# Patient Record
Sex: FEMALE | Race: WHITE | Employment: FULL TIME | URBAN - METROPOLITAN AREA
[De-identification: names, ages, dates, MRNs, and addresses within clinical notes are randomized per-mention and may not be internally consistent; named-entity substitution may affect disease eponyms.]

---

## 2018-01-30 ENCOUNTER — TRANSFERRED RECORDS (OUTPATIENT)
Dept: HEALTH INFORMATION MANAGEMENT | Facility: CLINIC | Age: 68
End: 2018-01-30

## 2018-02-07 ENCOUNTER — HOSPITAL ENCOUNTER (OUTPATIENT)
Dept: BEHAVIORAL HEALTH | Facility: CLINIC | Age: 68
End: 2018-02-07
Attending: PSYCHIATRY & NEUROLOGY
Payer: COMMERCIAL

## 2018-02-07 ASSESSMENT — PAIN SCALES - GENERAL: PAINLEVEL: NO PAIN (0)

## 2018-02-07 NOTE — PROGRESS NOTES
"Standard Diagnostic Assessment     CLIENT'S NAME: Michelle Michael  MRN:   [id number]  :   1950 AGE:67 year old SEX: female  ACCT. NUMBER: [id number]  DATE OF SERVICE: 18 Start Time:  1315 End Time:  1500      Home Phone [phone]   Work Phone Not on file.   Mobile [phone]     Preferred Phone:   May we leave a program related message? yes    Yes, the patient has been informed that any other mental health professional providing mental health services to me will need access to this Diagnostic Assessment in order to develop a treatment plan and receive payment.     Identifying Information:  Michelle Michael is a 67 year old, Choose not to answer,  female. Michelle attended the   alone.     Reason for Referral: Michelle was referred to [facility] by therapist. Michelle reports the reason for referral at this time is depression, anxiety, stress at work.    Michelle verbalizes the following treatment/discharge goals: Develop and apply coping skills to deal with the stress and anxiety at work, as well as home.     Current Stressors/Losses/Disappointments: problems at work, financial issues around gambling addiction    Per Client, Review of Symptoms:  Mood (Depression/Anxiety/Joi/Anger): depressed, anxious   Thoughts: \"scattered, my mind is cluttered by fear and anxiety\"   Concentration/Memory: poor   Appetite/Weight: (see also, Physical Health Screening below)  poor  Sleep: very poor, ruminating about work, having nightmares  Motivation/Energy: low  Behavior:  Withdrawn and isolative, angry outbursts    Psychosis:  none    Trauma:  Work issues  Other: recent move     Mental Health History:  Michelle reports first onset of mental health symptoms at age 33, depression.  Michelle was first diagnosed depression.   Michelle received the following mental health services in the past: counseling, physician / PCP, medication(s) from physician / PCP and primary care behavioral health provider.   Psychiatric " "Hospitalizations: None.   Michelle denies a history of civil commitment.      Onset/Duration/Pattern of Symptoms noted above:   Anxiety periodic throughout the day, sometimes more anxious at times that last about an hour.    Michelle reports the following understanding of her diagnosis: \"I don't need any more treatment for gambling.      Personal Safety:    Are you depressed or being treated for depression? yes   Have you ever thought about hurting yourself (SIB) now or in the past? yes     Have you ever thought about suicide now or in the past? I don't think I'd do it     Do you have a gun, weapons or other means (including medications) to harm yourself available to you? No   Have any of your family members or friends attempted or completed suicide? (If yes, Who, When, How) no     Do you take chances with your safety?   no   Have you currently or in the past had trouble with physical aggression (If yes, describe)? no     Have you ever thought about killing someone else? No   Have you ever heard voices? No       Supports:   From whom do you receive support? (family/friends/agency) Therapist, a couple good friends, 2 sisters     How often do you have contact with them? Twice a week     Do your support people want/need education/resources? no        Is there anything in your life (current or history) that is satisfying to you (include leisure interests/hobbies)?   yes , grandchildren, walking outdoors      Hope/Belief System:  Do you think things can get better? yes     Rate how strongly you believe things can get better:   (Scale 1-5; 1=no belief; 5=Very Strong Belief)    4    What would make it better?  New work environment    What gives you hope?    My belief in God, family       Personal Safety Summary:  After gathering the above information, Michelle  presents the following high risk factors for suicide: Poor sleep Panic,extreme anxiety Extreme psychic pain Hopelessness, Worthlessness.  Michelle denies current fears or " concerns for personal safety.    Michelle has the following Protective Factors: Sense of responsibility to family, Religiosity, Positive social support and Positive therapeutic releationships      Upon review of the patient interview and identification of high risk factors determine individualized safety strategies alternatives and treatment plan interventions. Client consented to co-developed safety plan, which includes resources.     Substance Use History:       Substance: Hx of Use/Abuse: Last Use: Pattern of Use:   Alcohol      Cannabis      Street Drugs      Prescription Drugs      Other        Substance Use Disorder Treatment: Michelle is currently receiving the following services: No indications of CD issues.         Michelle has a negative Cage-Aid score.     Legal History:    Michelle reports that she has not been involved with the legal system.   ________________________________________________________________________    Life Situation (Employment/School/Finances/Basic Needs):  Michelle  is currently living with  in an apartment.   The safety/stability of this environment is described as: safe, stable    Michelle is currently employed full time:   Michelle describes a work Hx of    Michelle reports finances are obtained through Employment  Michelle does identify her finances as a current stressor.  Michelle reports a history of gambling and reports a history of gambling treatment.     Michelle reports her highest level of education is college graduate  Michelle did not identify any learning problems   Michelle describes academic performance as: A's & B's   Michelle describes school social experience as: ok, lot os freinds     Michelle denies concerns regarding her current ability to meet basic needs.     Social/Family History:  Michelle  reports she grew up in [place], [place]. Some moving when father was in the [employer]  Michelle was the second born of 5 children. All girls.  Michelle reports her biological parents  are     Michelle describes her childhood as kind of stressed. Father alcoholic, moved a lot  Michelle describes her current relationships with her family of origin as getting along pretty well     Michelle identifies her relationship status as: .    Michelle identifies her sexual orientation as: opposite sex   Michelle denies sexual health concerns.     Michelle reports having 3 children. 2 boys, 1 girl    Michelle describes the quantity/quality of her social relationships as good, I have a good support system        Significant Losses / Trauma / Abuse / Neglect Issues / Developmental Incidents:  Michelle reports significant loss/trauma/abuse/neglect issues/developmental incidents   Michelle reports trauma around parents alcoholism  Michelle has addressed the above concerns in previous therapy/treatment     Michelle denies personal  experience.     Yarsanism Preference/Spiritual Beliefs/Cultural Considerations:    A. Ethnic Self-Identification:  Michelle self-identifies her race/ethnicities as:  and her preferred language to be English.   Michelle reports she does not need the assistance of an . Michelle  reports she does not need other support or modifications involved in therapy.      B. Do you experience cultural bias (the practice of interpreting judging behavior by standards inherent to one's own culture) by other people as a stressor? If yes, describe how this relates to overall mental health symptoms.  No    C. Are there any cultural influences that may need to be considered for your treatment?  (This includes historical, geographical and familial factors that affect assessment and intervention processes). No, Denies any cultural influences or concerns that need to be considered for treatment    Strengths/Vulnerabilities:   Michelle identifies her personal strengths as: caring, committed to sobriety, creative, educated, empathetic, employed, goal-focused, good listener, has a previous history of  therapy, insightful, intelligent, motivated, open to learning, open to suggestions / feedback, responsible parent, support of family, friends and providers, supportive, wants to learn, willing to ask questions, willing to relate to others and work history .   Things that may interfere with the clients success in treatment include: continued undue pressure at work.   Other identified areas of vulnerability include: Poor impulse control  Anxiety with/without panic attacks  Active/history of addiction/substance abuse  Depressive symptoms.     Medical History / Physical Health Screen:     Primary Care Physician: Michelle has a non-[facility] Primary Care Provider. Their PCP is Teresa Cristina..   Last Physical Exam: within the past year. Client was encouraged to follow up with PCP if symptoms were to develop.    Mental Health Medication Management Provider / Psychiatrist: Michelle has a psychiatrist whose name and location are: [facility].  [facility]   Last visit: 1-18        Next visit: Appt not made will see in March    Current medications including prescription, non-prescription, herbals, dietary aids and vitamins:  Per client report:   No outpatient prescriptions have been marked as taking for the 2/7/18 encounter (Hospital Encounter) with Dave Hendrickson LICSW.       Michelle reports current medications are: Effective.   Michelle describes taking her medications as: Independent.  Michelle reports taking prescribed medications as prescribed.     Michelle provides the following current assessment of pain:  ; Pain Score: No Pain (0);  .     Michelle provides the following information regarding past significant medical conditions/diagnoses:      Medical:  Past Medical History:   Diagnosis Date     Arthritis      Depressive disorder      GERD (gastroesophageal reflux disease)      Hypertension        Surgical:  Past Surgical History:   Procedure Laterality Date     APPENDECTOMY       GYN SURGERY  1987    hysterectomy     Allergy:   Michelle  reports   Allergies   Allergen Reactions     Hmg-Coa-R Inhibitors Muscle Pain (Myalgia)        Family History of Medical, Mental Health and/or Substance Use problems:  Per client report:   Family History   Problem Relation Age of Onset     Hypertension Mother      Anxiety Disorder Father      Depression Father      Substance Abuse Father      Hypertension Father      Substance Abuse Sister      Substance Abuse Sister      Breast Cancer Sister        Michelle reports no current medical concerns.      General Health:   Have you had any exposure to any communicable disease in the past 2-3 weeks? no     Are you aware of safe sex practices? yes     Is there a possibility of pregnancy?  no       Nutrition:    Are you on a special diet? If yes, please explain:  no   Do you have any concerns regarding your nutritional status? If yes, please explain:  no   Have you had any appetite changes in the last 3 months?  No     Have you had any weight loss or weight gain in the last 3 months?  No     Do you have a history of an eating disorder? no   Do you have a history of being in an eating disorder program? no   NOTE: BMI to be calculated following program admission.    Fall Risk:   Have you had any falls in the past 3 months? no     Do you currently useany assistive devices for mobility?   no     NOTE: If client reports 3 or more falls in the past 3 months, the client will not be accepted into the program until further assessment is completed by the program nurse. Check if a nurse is available to assess at time of DA.    NOTE: If client reports 2 falls in the past 3 months and/or the client currently uses assistive devices for mobility, the  will send an in-basket to the program nurse to meet with the client within the first week of programming.    Head Injury/Trauma:   Do you have a history of head injury / trauma? no     Do you have any cognitive impairment? no       Per completion of the Medical History / Physical Health  Screen, is there a recommendation to see / follow up with a primary care physician/clinic?    No.      Clinical Findings     Mental Status Assessment/Clinical Observation:  Appearance:   casually dressed and slightly unkempt  Eye Contact:   good  Psychomotor Behavior: Normal  no evidence of tardive dyskinesia, dystonia, or tics  Attitude:   Cooperative    Oriented to:   All    Speech   Rate / Production: Pressured    Volume:  Normal   Mood:    Anxious  Depressed  Sad     Affect:    Subdued  Worrisome      Thought Content:  Clear  no evidence of suicidal ideation or homicidal ideation  Thought Form:  logical, linear and goal oriented no loose associations  Insight:    good    Judgment:     intact  Attention Span/Concentration: intact  Recent and Remote Memory:  intact      Psychiatric Diagnosis:    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    Provisional Diagnostic Hypothesis (Explain R/O, other Provisional Diagnosis, and why alternative Diagnosis that were considered were ruled out):   N/A    Medical Concerns that may Impact Treatment:   None    Psychosocial and Contextual Factors (V-Codes):  V62.29 Other problem related to employment conflict with new manager    WHODAS 2.0 SCORE: 25/93.8 %      Client and family participation in assessment:   Michelle was alone during this assessment.   This assessment does include collateral information.      Summary & Recommendations  Provide a brief summary of how diagnostic criteria is met (symptoms, duration & functional impairment), cause, prognosis, and likely consequences of symptoms. Include overview of pertinent client strengths, cultural influences, life situations, relationships, health concerns and how diagnosis interacts/impacts with client's life. Recommendations include: client preferences, prioritization of needed mental health, ancillary or other services and any referrals to services  "required by statute or rule.     The client presents with symptoms of high anxiety and depression. We had attempted to complete the DA on 2/7, but client was not sure if any programs would be helpful, so did not proceed. She now requests the Partial Hospital Program, as the 55+ does not feel like enough support for what she needs right now. She has been having ongoing problems at work since she got a new manager. Her performance review was good regarding her work, but she was criticized for what she deems as \"character flaws\". She is extremely frightened of losing her job. Her  is retired. She has been through gambling treatment and has abstained for four months. She reports that she attends GA regularly, has a sponsor and some good friends in that program. However, she has also incurred a significant amount of debt, due to her gambling and is trying to pay this back. The client has an individual therapist and a CNP who prescribes medication. Her anxiety has been increasing and at times feels on the verge of a panic attack at work. She maintains that she still is able to complete her work, but does not know how much longer she can deal with this. She feels that her work situation has become almost adversarial and she has retained an  because of this. She feels if she can learn some effective coping technics and how to manage her anxiety she will be able to return to work. Currently, she has begun to sleep less and when she does sleep she has nightmares. She has very low energy and her relationship with her  is impacted by her situation. She apparently owes him money so he \"is not supportive, he just wants me to go work and pay him back\".   Prognosis is Fair. Without the recommended intervention, the client is likely to experience the following consequences of their symptoms: Increased anxiety and depression, likely return to gambling, loss of her job, potential hospitalization..    Referrals to " services required by statute or rule:   Report to child/adult protection services was NA.   Was/were discussed and client will pursue.    Program Recommendation: Partial Hospital Program.      Assessment Completed by: CATRACHITO ART RNC, Dave Hendrickson LICSW

## 2018-02-07 NOTE — PROGRESS NOTES
After meeting with client and explaining the various outpatient programs, the client felt she was uncertain if it would be able to work for her. She was hoping that the Dual Diagnosis program might be helpful, but after discussing the issue with the lead therapist, it was decided that, given the absence of a substance abuse problem, she would not meet criteria for that program. She was not interested in attending the gambling Treatment program, as she has been in that previously. The client also felt that the [facility] Program was not long enough and the 55+ program may not be appropriate for her right now.    The client decided to not proceed any further with the intake process and would discuss her situation further with her individual therapist, as well as her manager at work. She indicated that, perhaps in the future, if she felt it appropriate, she would us and set up another appointment for a diagnostic assessment.

## 2018-02-19 ENCOUNTER — HOSPITAL ENCOUNTER (OUTPATIENT)
Dept: BEHAVIORAL HEALTH | Facility: CLINIC | Age: 68
Discharge: HOME | End: 2018-02-19
Attending: PSYCHIATRY & NEUROLOGY | Admitting: PSYCHIATRY & NEUROLOGY
Payer: COMMERCIAL

## 2018-02-19 PROCEDURE — 90791 PSYCH DIAGNOSTIC EVALUATION: CPT

## 2018-02-23 ENCOUNTER — TELEPHONE (OUTPATIENT)
Dept: BEHAVIORAL HEALTH | Facility: CLINIC | Age: 68
End: 2018-02-23

## 2018-02-23 NOTE — TELEPHONE ENCOUNTER
D: Received call from pt this AM. She inquired about being able to start PHP on Monday. She met with her psychologist yesterday. They requested PHP help pt complete STD paperwork. Both support a plan for pt to return to work following completion of PHP. Pt also informed writer that she has to take her 89 yo mother for some all day dental work on Tuesday so she would not be able to attend programming.    I: Coordinated start date.     A: Pt is eager to start PHP.    P: Rescheduled start in PHP for 1/26/18. Informed PHP therapist of the above.    Jo Montez, TRE, [facility]  2/23/2018

## 2018-02-26 ENCOUNTER — HOSPITAL ENCOUNTER (OUTPATIENT)
Dept: BEHAVIORAL HEALTH | Facility: CLINIC | Age: 68
End: 2018-02-26
Attending: PSYCHIATRY & NEUROLOGY
Payer: COMMERCIAL

## 2018-02-26 VITALS — BODY MASS INDEX: 41.83 KG/M2 | HEIGHT: 64 IN | WEIGHT: 245 LBS

## 2018-02-26 PROBLEM — F63.0 PATHOLOGICAL GAMBLING: Status: ACTIVE | Noted: 2018-02-26

## 2018-02-26 PROCEDURE — H0035 MH PARTIAL HOSP TX UNDER 24H: HCPCS

## 2018-02-26 ASSESSMENT — ANXIETY QUESTIONNAIRES
6. BECOMING EASILY ANNOYED OR IRRITABLE: MORE THAN HALF THE DAYS
1. FEELING NERVOUS, ANXIOUS, OR ON EDGE: NEARLY EVERY DAY
2. NOT BEING ABLE TO STOP OR CONTROL WORRYING: NEARLY EVERY DAY
3. WORRYING TOO MUCH ABOUT DIFFERENT THINGS: MORE THAN HALF THE DAYS
GAD7 TOTAL SCORE: 14
IF YOU CHECKED OFF ANY PROBLEMS ON THIS QUESTIONNAIRE, HOW DIFFICULT HAVE THESE PROBLEMS MADE IT FOR YOU TO DO YOUR WORK, TAKE CARE OF THINGS AT HOME, OR GET ALONG WITH OTHER PEOPLE: EXTREMELY DIFFICULT
7. FEELING AFRAID AS IF SOMETHING AWFUL MIGHT HAPPEN: MORE THAN HALF THE DAYS
5. BEING SO RESTLESS THAT IT IS HARD TO SIT STILL: SEVERAL DAYS

## 2018-02-26 ASSESSMENT — PATIENT HEALTH QUESTIONNAIRE - PHQ9: 5. POOR APPETITE OR OVEREATING: SEVERAL DAYS

## 2018-02-26 NOTE — PROGRESS NOTES
Adult Mental Health   Mental Health Program   Progress Note   Group Time: 11:00-11:50    Client Initial Individualized Goals for Treatment:  Develop and apply coping skills to deal with the stress and anxiety at work, as well as home.       See Initial Treatment suggestions for the client during the time between Diagnostic Assessment and completion of the Master Individualized Treatment Plan.    Treatment Goals:     Follow Safety Plan   Ask for more information, support and/or assistance as needed.  Follow up with providers/community supports as needed:   Report increases or changes in symptoms to staff.  Report any personal safety concerns to staff.   Take medications as prescribed.  Report medication changes and/or side effects to staff.  Attend and participate in groups as scheduled or notify staff if unable to do so.  Report any use of substances to staff as this may impact your symptoms and/or                      personal safety.  Notify staff if you have any other issues that need to be addressed. This may include              any current abuse / neglect / exploitation or other vulnerability.  Follow recommendations of your treatment team and discuss concerns if not in            agreement       Area of Treatment Focus:  Symptom Management, Develop / Improve Independent Living Skills, Develop Socialization / Interpersonal Relationship Skills and Physical Health     Therapeutic Interventions/Treatment Strategies:  Support, Redirection, Feedback, Structured Activity, Problem Solving, Education and Motivational Enhancement Therapy    Response to Treatment Strategies:  Accepted Feedback, Gave Feedback, Listened, Attentive, Accepted Support and Alert    Name of Group:  Excercise    Description and Outcome:  Group discussed the importance of Exercise. The 3 different types of exercise were discussed, cardio-vascular, strength and flexibility.Group talked about the benefits to exercise and discussed was to  incorporate exercise into their weekly routines. Group discussed mental barriers to exercise, safety tips while exercising, motivation techniques to exercise and group worked through case study to problem solve ways to incorporate exercise into daily life. Mindfulness chair yoga was practiced for 15 minutes.   Assessment  Appearance/ Mood: Calm behavior, range in affect, stable mood throughout group. Affect was consistent with mood.  Appropriate dress, well-groomed and was cooperative within group.   Thought Process: Linear and logical, productive and goal directed. Contributed to group conversation.   Behavior: Cooperative, interacted within the group, listened and was respectful to others    Understanding of the lesson: Patient was able to explain benefits to exercise.       Is this a Weekly Review of the Progress on the Treatment Plan?  No

## 2018-02-26 NOTE — PROGRESS NOTES
Group Therapy Progress Notes     Client Initial Individualized Goals for Treatment:  Develop and apply coping skills to deal with the stress and anxiety at work, as well as home.         See Initial Treatment suggestions for the client during the time between Diagnostic Assessment and completion of the Master Individualized Treatment Plan.     Treatment Goals:      Follow Safety Plan   Ask for more information, support and/or assistance as needed.  Follow up with providers/community supports as needed:   Report increases or changes in symptoms to staff.  Report any personal safety concerns to staff.   Take medications as prescribed.  Report medication changes and/or side effects to staff.  Attend and participate in groups as scheduled or notify staff if unable to do so.  Report any use of substances to staff as this may impact your symptoms and/or   personal safety.  Notify staff if you have any other issues that need to be addressed. This may include  any current abuse / neglect / exploitation or other vulnerability.  Follow recommendations of your treatment team and discuss concerns if not in  agreement    Area of Treatment Focus:  Symptom Management    Therapeutic Interventions/Treatment Strategies:  Support, Safety Assessments, Structured Activity and Education    Response to Treatment Strategies:  Accepted Feedback, Listened, Focused on Goals, Attentive and Accepted Support    Name of Group:  Self Talk    Progress Note  Michelle was engaged in self talk and participated in mapping the impact of a change physiologically, emotionally, cognitively, behaviorally and spiritually.  We discussed how changes in lifestyle and thought patterns impact the sympathetic nervous system.  She found her first day of the partial program helpful and was planning on returning tomorrow. She was going to relax and rest due to feeling tired.           Is this a Weekly Review of the Progress on the Treatment Plan?  No

## 2018-02-26 NOTE — PROGRESS NOTES
RN Review of Medical History / Physical Health Screen  Outpatient Behavioral Programs      CLIENT'S NAME: Michelle Michael  MRN:   [id number]  :   1950 AGE:67 year old SEX: female    DATE OF DIAGNOSTIC ASSESSMENT: 18  DATE OF ADMISSION: 18   PROGRAM: [facility] ()      Following admission, the RN reviewed the following:    - Medical History / Physical Health Screen completed during the DA noted above.  - Immediate Health Concerns as noted on the Initial Individual Treatment Plan.    Client height and weight recorded by RN in epic: yes    BMI Review:  Was the patient informed of BMI? yes      Findings Above,  General nutrition education       RN Recommendations include: Continue to educate on nutrition and exercise. Educate on the importance to monitor regularly and if increase continues follow up with primary provider.        Brodie Casey  2018

## 2018-02-27 ENCOUNTER — HOSPITAL ENCOUNTER (OUTPATIENT)
Dept: BEHAVIORAL HEALTH | Facility: CLINIC | Age: 68
End: 2018-02-27
Attending: PSYCHIATRY & NEUROLOGY
Payer: COMMERCIAL

## 2018-02-27 PROCEDURE — H0035 MH PARTIAL HOSP TX UNDER 24H: HCPCS

## 2018-02-27 ASSESSMENT — PATIENT HEALTH QUESTIONNAIRE - PHQ9: SUM OF ALL RESPONSES TO PHQ QUESTIONS 1-9: 12

## 2018-02-27 ASSESSMENT — ANXIETY QUESTIONNAIRES: GAD7 TOTAL SCORE: 14

## 2018-02-27 NOTE — PROGRESS NOTES
Group Therapy Progress Notes     Client Initial Individualized Goals for Treatment:  Develop and apply coping skills to deal with the stress and anxiety at work, as well as home.           See Initial Treatment suggestions for the client during the time between Diagnostic Assessment and completion of the Master Individualized Treatment Plan.      Treatment Goals:       Follow Safety Plan   Ask for more information, support and/or assistance as needed.  Follow up with providers/community supports as needed:   Report increases or changes in symptoms to staff.  Report any personal safety concerns to staff.   Take medications as prescribed.  Report medication changes and/or side effects to staff.  Attend and participate in groups as scheduled or notify staff if unable to do so.  Report any use of substances to staff as this may impact your symptoms and/or   personal safety.  Notify staff if you have any other issues that need to be addressed. This may include  any current abuse / neglect / exploitation or other vulnerability.  Follow recommendations of your treatment team and discuss concerns if not     Area of Treatment Focus:  Symptom Management, Personal Safety, Community Resources/Discharge Planning, Abstinence/Relapse Prevention, Develop / Improve Independent Living Skills and Develop Socialization / Interpersonal Relationship Skills    Therapeutic Interventions/Treatment Strategies:  Support, Safety Assessments, Structured Activity and Education    Response to Treatment Strategies:  Accepted Feedback, Listened, Focused on Goals, Attentive and Accepted Support    Name of Group: Psychotherapy Group and Aftercare Planning    Progress Note  Michelle reports being exhausted today. After the partial program yesterday, her  wanted to bring her to motor vehicle to change her address on her license. They had to wait an hour. Then she went to the Pharmacy to get a new medication filled which had a 45 minute wait. She  reports her  is a bit controlling. He had untreated Bipolar Disorder. Michelle reports waking up at 4: 30 am with a panic attack and feelings of guilt. She states her gambling behaviors and treatment often prevented her from being a mom that she wanted to be with her son who age is 31. She called him and wanted to talk with him. He was at work and resides in [place]. He was going to call her back today. She will not be in the treatment program tomorrow due to having to take her elderly mom to the dentist to get her wisdom teeth out. We discussed the importance of self care and lifestyle balance. In Aftercare Planning, Michelle began naming her recovery needs on her coping cards which is a relapse prevention tool. She has been free from gambling behaviors for five months. She attends GA meeting and has a sponsor. Michelle will resume the partial program on Thursday, March 1.          Is this a Weekly Review of the Progress on the Treatment Plan?  No

## 2018-02-27 NOTE — PROGRESS NOTES
Adult Mental Health Outpatient Group Therapy Progress Note     Client Initial Individualized Goals for Treatment: Develop and utilize coping strategies to deal with anxiety and depression    See Initial Treatment suggestions for the client during the time between Diagnostic Assessment and completion of the Master Individualized Treatment Plan.    Treatment Goals:         Area of Treatment Focus:  Symptom Management, Personal Safety, Community Resources/Discharge Planning, Develop / Improve Independent Living Skills and Develop Socialization / Interpersonal Relationship Skills    Therapeutic Interventions/Treatment Strategies:  Support, Feedback, Education and Cognitive Behavioral Therapy    Response to Treatment Strategies:  Accepted Feedback, Gave Feedback, Listened and Focused on Goals    Name of Group:  Grief and Loss, Mindfulness    Description and Outcome:    Michelle participated in third and last groups of the day. Discussed the range of experiences of loss as including both small daily losses all the way up to the loss of close relationships due to death. Considered model of grief as one which promotes change and growth of the person as they accept feelings and experiences around the loss. Identified central role of allowing the grief experience to proceed in its own particular way, without trying to constantly control the experience.     Discussed impact of mindfulness on depression and anxiety and relationship to non-judgmental awareness of emotions. Practiced mindfulness, discussed importance of regular daily practice. Client demonstrated understanding of session by identifying examples of how content applied to self, asking clarifying questions, providing feedback to other group members who were addressing concepts.    Is this a Weekly Review of the Progress on the Treatment Plan?  No

## 2018-02-27 NOTE — PROGRESS NOTES
Adult Mental Health Partial Hospitalization Group Therapy Progress Note     Date: 2/26/18  Time: 1:00-1:50    Client Initial Individualized Goals for Treatment: Develop and apply coping skills to deal with the stress and anxiety at work, as well as home.     Initial Treatment suggestions for the client during the time between Diagnostic Assessment and completion of the ITP:   Follow Safety Plan   Ask for more information, support and/or assistance as needed.  Follow up with providers/community supports as needed:   Report increases or changes in symptoms to staff.  Report any personal safety concerns to staff.   Take medications as prescribed.  Report medication changes and/or side effects to staff.  Attend and participate in groups as scheduled or notify staff if unable to do so.  Report any use of substances to staff as this may impact your symptoms and/or personal safety.  Notify staff if you have any other issues that need to be addressed. This may include any current abuse / neglect / exploitation or other vulnerability.  Follow recommendations of your treatment team and discuss concerns      Area of Treatment Focus:  Symptom Management, Personal Safety, Community Resources/Discharge Planning, Develop / Improve Independent Living Skills and Develop Socialization / Interpersonal Relationship Skills    Therapeutic Interventions/Treatment Strategies:  Support, Feedback, Safety Assessments, Structured Activity, Problem Solving, Clarification and Education    Response to Treatment Strategies:  Accepted Feedback, Gave Feedback, Listened, Focused on Goals, Attentive, Accepted Support and Alert    Name of Group:  OT life skills: goal integration     Description and Outcome: Today is Michelle's first day in the Partial Hospitalization Program. She attended and participated in a structured  life skills group where intervention focuses on learning, practicing, and applying skills and strategies through psycho-education and  "structured experiential activities to manage mental health, improve symptoms, gain insight, and function in valued roles, routines, and relationships.    Group topic today is goal integration (identify meaningful ways to apply learned skills and insight into their every day life). Psychoeducation around the concepts of values based goal setting and self-compassion was provided. This was followed by definition of SMART goals (specific, measurable, achievable, relevant, time bound). Group members were led through structured process of 1) reviewing their treatment and/or SMART goals from last week 2) reflect on the positives or successes they experienced in the past week. 3) Identify 3 functional areas they want to focus on this week, 4) write a SMART goal for each of those. Lastly, 5) schedule their SMART goals into weekly planner and address any barriers with group.      Reflection: she made it to the program, she is focused on taking care of herself.         Functional areas Michelle will focus on this week: leisure, assertiveness, physical activity      SMART Goals for this week:   1) Michelle will engage in a fun leisurely activity for 30 minutes 3x this week   2) Michelle will use \"I messages\" with her  2x this week and note their effectiveness.  3) Michelle will do chair yoga 3x this week.      Michelle presents with a calm affect and easily asks questions, she does struggle with the process and needs one on one guidance.  Validation and support provided. Client would benefit from additional opportunities to practice and implement content from this session.    Is this a Weekly Review of the Progress on the Treatment Plan?  No      "

## 2018-02-27 NOTE — PROGRESS NOTES
"Adult Mental Health Outpatient Group Therapy Progress Note     Client Initial Individualized Goals for Treatment: Develop and utilize coping strategies to deal with anxiety and depression    See Initial Treatment suggestions for the client during the time between Diagnostic Assessment and completion of the Master Individualized Treatment Plan.    Treatment Goals:         Area of Treatment Focus:  Symptom Management, Personal Safety, Community Resources/Discharge Planning, Develop / Improve Independent Living Skills and Develop Socialization / Interpersonal Relationship Skills    Therapeutic Interventions/Treatment Strategies:  Support, Feedback, Education and Cognitive Behavioral Therapy    Response to Treatment Strategies:  Accepted Feedback, Gave Feedback, Listened and Focused on Goals    Name of Group:  Group psychotherapy, Mental Health Management     Description and Outcome:    Michelle started in the adult partial hospitalization program today. Participated in initial daily mindfulness practice. Oriented to program. Went to meet with program healthcare professional before she had much chance to talk. She did note that she is feeling anxious about doing something new and also acknowledges she is not sure that this program is what she needs at this juncture. Says she \"kind of felt like\" she was having \"an out-of-body-experience\" all weekend, leading up to starting the program today.    Discussed communication in relationships when addressing solvable and \"unresolvable problems, per Gottman research, emphasized importance of maintaining connectedness while addressing problems. Client demonstrated understanding of session by identifying examples of how content applied to self, asking clarifying questions, providing feedback to other group members who were addressing concepts.    Is this a Weekly Review of the Progress on the Treatment Plan?  No  "

## 2018-02-28 NOTE — PROGRESS NOTES
Adult Mental Health Partial Hospitalization Group Therapy Progress Note     Date: 2/27/18  Time: 1:00-1:50    Client Initial Individualized Goals for Treatment: Develop and apply coping skills to deal with the stress and anxiety at work, as well as home.     Initial Treatment suggestions for the client during the time between Diagnostic Assessment and completion of the ITP:   Follow Safety Plan   Ask for more information, support and/or assistance as needed.  Follow up with providers/community supports as needed:   Report increases or changes in symptoms to staff.  Report any personal safety concerns to staff.   Take medications as prescribed.  Report medication changes and/or side effects to staff.  Attend and participate in groups as scheduled or notify staff if unable to do so.  Report any use of substances to staff as this may impact your symptoms and/or personal safety.  Notify staff if you have any other issues that need to be addressed. This may include any current abuse / neglect / exploitation or other vulnerability.  Follow recommendations of your treatment team and discuss concerns      Area of Treatment Focus:  Symptom Management, Personal Safety, Community Resources/Discharge Planning, Develop / Improve Independent Living Skills and Develop Socialization / Interpersonal Relationship Skills    Therapeutic Interventions/Treatment Strategies:  Support, Feedback, Safety Assessments, Structured Activity, Problem Solving, Clarification and Education    Response to Treatment Strategies:  Accepted Feedback, Gave Feedback, Listened, Focused on Goals, Attentive, Accepted Support and Alert    Name of Group:  OT life skills: Mental Health Management    Description and Outcome: Michelle attended and participated in a structured  life skills group where intervention focuses on learning, practicing, and applying skills and strategies through psycho-education and structured experiential activities to manage mental  health, improve symptoms, gain insight, and function in valued roles, routines, and relationships.    Group topic today is focused on self-regulation through active meditation. Concepts taught/reviewed include neuroscience/evidence around 5 most impactful behaviors we can do that help us with self-regulation and resiliency: 1) Sleep hygiene, 2) mindfulness 3) exercise/physical activity, 4) nutrition and 5) connections . Followed by review of definition of mindfulness and an exploration of the multiple ways to practice mindfulness including active meditation in contrast to quiet meditation as an embodied experience. Group members were then guided through an introductory session on Pop Chi which has one focus on breath; sensory input: including qualities of movement, body awareness and positioning; and quieting the mind to be intentional, in the here and now, and non-judgmental. Michelle participates fully, is engaged, and asks good questions. She reports appreciating the focus on balance and weight shift. Validation and support provided. Client would benefit from additional opportunities to practice and implement content from this session.    Is this a Weekly Review of the Progress on the Treatment Plan?  No

## 2018-02-28 NOTE — H&P
Psychiatric  Diagnostic Assessment     CLIENT'S NAME: Michelle Michael  MRN:   [id number]  :   1950 AGE:67 year old SEX: female  ACCT. 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 by therapist. Michelle reports the reason for referral at this time is depression, anxiety, stress at work.Pt reports she has a long hx of depression and gambling for 16 years in and out of tx for her gambling.   Pt reports she relieved her anxiety by gambling and since she has not gambled for 4 months she has had an increase in anxiety.  She has a hard time facing her emotions and was escaping from them.  She reports panic attacks a lot after treatment, a little better now also sleep onset delay and waking multiple times at hs with bad dreams which she attributes to her Cymbalta which she started in treatment.   She describes feeling numb, a lot of anger, waking up w panic at hs, doxepin 30mg may  Have helped this some. Daily dread   She also reports mild hypomanic symptoms when starting on Cymbalta, more energetic, feeling really good and not needing as much sleep and not missing it.  After two weeks this went away.    Her major stressor is work which is overly critical and she thinks they are trying to get rid of her at work.  She is a nurse for [employer].  She  Also has a stressful relationship w her  and he has bipolar illness and does not do much at home and is a passive partner in their marriage.     She also describes periods of spending too much money, hyper focus on purchasing things, ex bar stools for house, eating to excess, some trouble throwing things away, obsessive concerns about safety.  Poor focus, decrease need for sleep a few nights a week, racing thoughts, being wither off or on, and demanding of others at these times.           Current  "Stressors/Losses/Disappointments: problems at work, financial issues around gambling addiction    Per Client, Review of Symptoms:  Mood (Depression/Anxiety/Joi/Anger): depressed, anxious   Thoughts: \"scattered, my mind is cluttered by fear and anxiety\"   Concentration/Memory: poor   Appetite/Weight: (see also, Physical Health Screening below)  poor  Sleep: very poor, ruminating about work, having nightmares  Motivation/Energy: low  Behavior:  Withdrawn and isolative, angry outbursts    Psychosis:  none    Trauma:  Work issues  Other: recent move     Mental Health History:  Gambling tx  Trials of zoloft prior to cymbalta, not help much,   Was on it for 5 yrs, also on effexor for 6 years prior to this.   Good response, felt better hard to get off, prozac at age 33, stopped working,   Naltrexone, helped urges a bit     No hospitalizations, no ECT, is in therapy w Isaiah Javier and yolis Teresa Terry for meds at [facility]  Onset/Duration/Pattern of Symptoms noted above:   Anxiety periodic throughout the day, sometimes more anxious at times that last about an hour.    Michelle reports the following understanding of her diagnosis: \"I don't need any more treatment for gambling.      Personal Safety:PT denies being suicidal or that she would act but does peel her skin and lick at it, last episode was 3 weeks ago,     Are you depressed or being treated for depression? yes   Have you ever thought about hurting yourself (SIB) now or in the past? yes     Have you ever thought about suicide now or in the past? I don't think I'd do it     Do you have a gun, weapons or other means (including medications) to harm yourself available to you? No                                                     Personal Safety Summary:  After gathering the above information, Michelle  presents the following high risk factors for suicide: Poor sleep Panic,extreme anxiety Extreme psychic pain Hopelessness, Worthlessness.  Michelle denies " current fears or concerns for personal safety.    Substance Use History:       Substance: Hx of Use/Abuse: Last Use: Pattern of Use:   Alcohol      Cannabis      Street Drugs      Prescription Drugs      Other        Substance Use Disorder Treatment: Michelle is currently receiving the following services: No indications of CD issues. But id out of treatment for gambling now for 4 months         Michelle has a negative Cage-Aid score.     Legal History:    Mihcelle reports that she has not been involved with the legal system.   ________________________________________________________________________      Social/Family History:  Michelle  reports she grew up in [place], [place]. Some moving when father was in the [employer]  Michelle was the second born of 5 children. All girls.  Michelle reports her biological parents are     Michelle describes her childhood as kind of stressed. Father alcoholic, moved a lot  Michelle describes her current relationships with her family of origin as getting along pretty well     Michelle identifies her relationship status as: .    Michelle identifies her sexual orientation as: opposite sex   Michelle denies sexual health concerns.     Michelle reports having 3 children. 2 boys, 1 girl    Michelle describes the quantity/quality of her social relationships as good, I have a good support system            Medical History / Physical Health Screen:     Primary Care Physician: Michelle has a non-[facility] Primary Care Provider. Their PCP is Teresa Cristina..   Last Physical Exam: within the past year. Client was encouraged to follow up with PCP if symptoms were to develop.    Mental Health Medication Management Provider / Psychiatrist: Michelle has a psychiatrist whose name and location are: Teresa Terry.  [facility]   Last visit: 1-18        Next visit: Appt not made will see in March    Current medications including prescription, non-prescription, herbals, dietary aids and vitamins:  Per client report:   No outpatient  prescriptions have been marked as taking for the 2/27/18 encounter (Hospital Encounter) with ADULT PH SCHEDULE A.     Cymbalta  60mg daily and doxepin 30mg at hs as well as metoporol, baby asprin,     Michelle provides the following current assessment of pain:  ; Pain Score: No Pain (0);  .     Michelle provides the following information regarding past significant medical conditions/diagnoses:      Medical:  Past Medical History:   Diagnosis Date     Arthritis      Depressive disorder      GERD (gastroesophageal reflux disease)      Hypertension        Surgical:  Past Surgical History:   Procedure Laterality Date     APPENDECTOMY       GYN SURGERY  1987    hysterectomy     Allergy:   Michelle reports   Allergies   Allergen Reactions     Hmg-Coa-R Inhibitors Muscle Pain (Myalgia)        Family History of Medical, Mental Health and/or Substance Use problems:  Per client report:   Family History   Problem Relation Age of Onset     Hypertension Mother      Anxiety Disorder Father      Depression Father      Substance Abuse Father      Hypertension Father      Substance Abuse Sister      Substance Abuse Sister      Breast Cancer Sister      ROS:  ENT, normal GI/ normal, endocrine, hem, normal, neurologic, normal, resp, normal Cardiac positive for HTN, elevated lipids but unable to take statins, vessels are clear, hx QT prolongation in past, also hx of intermittent atria flutter, and tachycardia, had echo for this and a work up,     Head Injury/Trauma:   Do you have a history of head injury / trauma? no     Do you have any cognitive impairment? no           Clinical Findings     Mental Status Assessment/Clinical Observation:  Appearance:   casually dressed and slightly unkempt  Eye Contact:   good  Psychomotor Behavior: Normal  no evidence of tardive dyskinesia, dystonia, or tics  Attitude:   Cooperative    Oriented to:   All    Speech   Rate / Production: Pressured    Volume:  Normal   Mood:    Anxious  Depressed  Sad      Affect:    Subdued  Worrisome      Thought Content:  Clear  no evidence of suicidal ideation or homicidal ideation  Thought Form:  logical, linear and goal oriented no loose associations  Insight:    good    Judgment:     intact  Attention Span/Concentration: intact  Recent and Remote Memory:  intact      Psychiatric Diagnosis:    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  She would like dx clarification and there is some concern about light bipolar 2 symptoms. She is motivated to change and is appropriate for PHP,   WE discussed risks and benefits of medications as well as side effects, we are going to decrease her doxepin given her hx of QT prolongation.  We will decrease it over next few days and may consider something like a benzo for sleep.  She is also interested in gensight testing  She is on leave from work and may need some paperwork done for FMLA.  She will continue on Cymbalta 60mg a day, doxepin 20mg at hs for two nights then 10mg at hs and we will evaluate from here  Trudy Dejesus RN, MS, CNS, APRN

## 2018-03-01 ENCOUNTER — HOSPITAL ENCOUNTER (OUTPATIENT)
Dept: BEHAVIORAL HEALTH | Facility: CLINIC | Age: 68
End: 2018-03-01
Attending: PSYCHIATRY & NEUROLOGY
Payer: COMMERCIAL

## 2018-03-01 PROCEDURE — H0035 MH PARTIAL HOSP TX UNDER 24H: HCPCS

## 2018-03-01 NOTE — PROGRESS NOTES
Group Therapy Progress Notes     Client Initial Individualized Goals for Treatment:  Utilize coping skills to manage anxiety and assert boundaries.            See Initial Treatment suggestions for the client during the time between Diagnostic Assessment and completion of the Master Individualized Treatment Plan.      Treatment Goals:      Follow Safety Plan   Ask for more information, support and/or assistance as needed.  Follow up with providers/community supports as needed:   Report increases or changes in symptoms to staff.  Report any personal safety concerns to staff.   Take medications as prescribed.  Report medication changes and/or side effects to staff.  Attend and participate in groups as scheduled or notify staff if unable to do so.  Report any use of substances to staff as this may impact your symptoms and/or   personal safety.  Notify staff if you have any other issues that need to be addressed. This may include  any current abuse / neglect / exploitation or other vulnerability.  Follow recommendations of your treatment team and discuss concerns if not     Area of Treatment Focus:  Symptom Management, Personal Safety, Community Resources/Discharge Planning, Abstinence/Relapse Prevention, Develop / Improve Independent Living Skills and Develop Socialization / Interpersonal Relationship Skills    Therapeutic Interventions/Treatment Strategies:  Support, Safety Assessments, Structured Activity and Education    Response to Treatment Strategies:  Accepted Feedback, Listened, Focused on Goals, Attentive and Accepted Support    Name of Group:  Psychotherapy Group and Interpersonal Relationships    Progress Note  Michelle reports anxious mood but feels more grounded in her body. Denies S/I or safety issues. She talked with her son who resides in [place], [place]. He disclosed to her that he got a DUI around New Years and had to go to court next week. He does not have an alcohol abuse issues. He has anxiety  himself and Michelle shared how she is learning how to manage anxiety. Michelle took her mom to the dentist yesterday to get some teeth pulled. In Interpersonal Relationships, Michelle created a map of her relationships in her life, identified level of support as well as conflict. She also began to name interpersonal relationship themes including who she would want to enroll in her recovery. Michelle is open and engaged in groups. She is aware of symptom management skills. She continues to maintain her abstinence from gamblings. She attends [facility] and has a sponsor. Michelle will continue the partial program.           Is this a Weekly Review of the Progress on the Treatment Plan?  Yes.  Michelle met with the treatment team to update and review her treatment plan goals. She would like to have more anxiety management skills as well as set boundaries within relationships. She also is learning how to self care. She continues to process her feelings and needs in groups. She is finding the partial program an ideal fit.     Are Treatment Plan Goals being addressed?  Yes, treatment goals revised      Are Treatment Plan Strategies to Address Goals Effective?  Yes, continue treatment strategies      Are there any current contracts in place?  No

## 2018-03-01 NOTE — PROGRESS NOTES
Group Therapy Progress Notes     Area of Treatment Focus:  Symptom Management and Develop Socialization / Interpersonal Relationship Skills    Therapeutic Interventions/Treatment Strategies:  Support, Feedback, Structured Activity, Clarification and Education    Response to Treatment Strategies:  Accepted Feedback, Gave Feedback, Listened, Focused on Goals, Attentive, Accepted Support and Alert    Name of Group:  Mental health management, self awareness    Progress Note  Mental Health Management:The group was given a structured journaling worksheet with the focus on feeling identification, expression and containment.  Information on the purpose and benefits of structured journaling was discussed.  Group members were offered the opportunity to share part, all, or none of their journaling and were encouraged to comment on process.  Michelle chose to work with feeling of fear.  She demonstrated understanding of session by participating in exercise and sharing her insights regarding fear with the group.    Self Awareness:The group was provided with a guided breathing and warmup structure with focus on increasing self awareness and on providing an embodied experience.  Discussion included the importance of listening to body cues as a way of identifying emotion as well as accompanying needs and wants, and as a way of practising self compassion, authenticity, mindfulness, self expression,  and connection to other. Michelle demonstrated understanding of session by participating in experiential and sharing her observations.  She identified a positive sense of power when moving with strength.          Is this a Weekly Review of the Progress on the Treatment Plan?  No

## 2018-03-02 ENCOUNTER — HOSPITAL ENCOUNTER (OUTPATIENT)
Dept: BEHAVIORAL HEALTH | Facility: CLINIC | Age: 68
End: 2018-03-02
Attending: PSYCHIATRY & NEUROLOGY
Payer: COMMERCIAL

## 2018-03-02 PROCEDURE — H0035 MH PARTIAL HOSP TX UNDER 24H: HCPCS

## 2018-03-02 NOTE — PROGRESS NOTES
"Adult Mental Health Outpatient Group Therapy Progress Note     Client Initial Individualized Goals for Treatment: Develop and utilize coping strategies to deal with anxiety and depression    See Initial Treatment suggestions for the client during the time between Diagnostic Assessment and completion of the Master Individualized Treatment Plan.    Treatment Goals:         Area of Treatment Focus:  Symptom Management, Personal Safety, Community Resources/Discharge Planning, Develop / Improve Independent Living Skills and Develop Socialization / Interpersonal Relationship Skills    Therapeutic Interventions/Treatment Strategies:  Support, Feedback, Education and Cognitive Behavioral Therapy    Response to Treatment Strategies:  Accepted Feedback, Gave Feedback, Listened and Focused on Goals    Name of Group:  Group psychotherapy, Network Development    Description and Outcome:    Michelle participated in first two groups of the day. Says she slept pretty well last night, better than she has \"in years\". She listened ot relaxation music and this was helpful. She saw grandkids last night and tried to be mindful during that visit. Gave positive feedback for using skills.      Discussed value of effective support in managing mental health issues, as well as common behavior of people who have depression to never ask for support. Completed exercise to begin identifying support needs and possible ways to work through barriers to asking for support. Client demonstrated understanding of session by identifying examples of how content applied to self, asking clarifying questions, providing feedback to other group members who were addressing concepts.    Is this a Weekly Review of the Progress on the Treatment Plan?  No  "

## 2018-03-02 NOTE — PROGRESS NOTES
[facility], [place]   Psychiatric Progress Note      Impression:   This is a 67 year old female with major depression and anxiety related to her work environment.  Feeling like she is in a hostile work environment and would like to work in a different department   Michelle appears to be less anxious and less depressed since starting lamictal and relief about future . Pt sleeping better     Notes have been reviewed and treatment team consulted.  [employer] disability paperwork filled out.       DIagnoses:     Axis I: F33.1 Major Depression Moderate and F41.1 LOAN rule out Bipolar    Axis II: none    Axis III: over weight, hx QT interval prolongation,     Axis IV:  Severe related to work stressors psychosocial stressors    Axis V: Global Assessment of Functionin         Plan:   } Pt will continue in PHP, she was given number for [facility] for IOP program after she completes PHP  Lamictal 25mg daily for two weeks then 50mg daily  Cymbalta 60mg daily   Doxepin taper 20mg for 4 more dys then 10mg for four days and stop if able  Pt of of work until 2018 and hopes to work 6 hour days and 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     Pt feeling better, started lamictal this week, pt tolerating doxepin taper and is sleeping better, she had a night w good sleep and no bad dreams, anxiety remains high mostly in context of work, pt is feeling more hopeful         Medications:   Cymbalta 60mg daily  Doxepin 20mg daily for four nights then 10mg nightly  Lamictal 25mg daiy for two weeks then 50mg daily          Allergies:     Allergies   Allergen Reactions     Hmg-Coa-R Inhibitors Muscle Pain (Myalgia)            Psychiatric Examination:   There were no vitals taken for this visit.  Weight is 0 lbs 0 oz  There is no height or weight on file to  calculate BMI.    Appearance:  adequately groomed  Attitude:  cooperative  Eye Contact:  good  Mood:  good  Affect:  mood congruent  Speech:  clear, coherent  Psychomotor Behavior:  no evidence of tardive dyskinesia, dystonia, or tics  Thought Process:  logical  Associations:  no loose associations  Thought Content:  no evidence of suicidal ideation or homicidal ideation  Insight:  good  Judgment:  intact  Oriented to:  time, person, and place  Attention Span and Concentration:  intact  Recent and Remote Memory:  intact  Language: normal  Fund of Knowledge: appropriate  Muscle Strength and Tone: normal  Gait and Station: Normal         Labs:    none    Trudy Acevedo RN, MS, CNS,  APRN

## 2018-03-02 NOTE — PROGRESS NOTES
Adult Mental Health Partial Hospitalization Group Therapy Progress Note     Date: 3/01/18  Time: 1:00-1:50    Client Initial Individualized Goals for Treatment: Develop and apply coping skills to deal with the stress and anxiety at work, as well as home.     Treatment Goals from ITP:  1) Safety: Client will use coping plan for safety, as needed.  2) Symptom Management: Michelle will process her triggers for anxiety and panic and learn and practice skills around managing symptoms of depression and anxiety.   3) Life Skills:  Michelle will:    Learn, practice, generalize 2-3 skills/strategies that help promote lifestyle balance including self-care and leisure activities    Learn, practice, apply 2 sensory or mindfulness based coping skills for increased participation and enjoyment of meaningful activities and relationships  4) Discharge Preparation: Michelle will develop Aftercare Plan including:    Have a transition plan in place by discharge    Learn and verbalize understanding of relapse management.     Area of Treatment Focus:  Symptom Management, Personal Safety, Community Resources/Discharge Planning, Develop / Improve Independent Living Skills and Develop Socialization / Interpersonal Relationship Skills    Therapeutic Interventions/Treatment Strategies:  Support, Feedback, Safety Assessments, Structured Activity, Problem Solving, Clarification and Education    Response to Treatment Strategies:  Accepted Feedback, Gave Feedback, Listened, Focused on Goals, Attentive, Accepted Support and Alert    Name of Group:  OT life skills: self care    Description and Outcome: Michelle attended and participated in a structured  life skills group where intervention focuses on learning, practicing, and applying skills and strategies through psycho-education and structured experiential activities to manage mental health, improve symptoms, gain insight, and function in valued roles, routines, and relationships.    Today topic is on  lifestyle balance/routine/structure with an emphasis on leisure values and strategies to incorporate leisure for improved nervous system regulation. Education on evidence for leisure activity during psychiatric recovery presented. Group members were then guided through a process of identifying their leisure values, leisure activities from their past, present leisure activities, and finally moving forward what leisure activities they would like to pursue (identifying initial steps as well as barriers). Michelle identifies to gain and maintain health to gain a sense of success and self-efficacy, to laugh and enjoy, to explore and develop new skills, and to do what she wants, as her leisure values. She reflected that used to dance a lot and it fit her values. She would like to pursue walking again and get back to quilting and shared she knows she needs to start small and will incorporate these ideas in her goal integration work on Monday in this program.  She would benefit from additional opportunities to gain insight/understanding, and practice and implement content from this session.  Affect even, focused, appeared calm. Validation and support provided.    Is this a Weekly Review of the Progress on the Treatment Plan?  Yes.      Are Treatment Plan Goals being addressed?  Yes, continue treatment goals      Are Treatment Plan Strategies to Address Goals Effective?  Yes, continue treatment strategies      Are there any current contracts in place?  No

## 2018-03-02 NOTE — PROGRESS NOTES
Adult Mental Health Outpatient Group Therapy Progress Note     Group Time: 11:00-11:50    Client Initial Individualized Goals for Treatment:  Develop and apply coping skills to deal with the stress and anxiety at work, as well as home.       See Initial Treatment suggestions for the client during the time between Diagnostic Assessment and completion of the Master Individualized Treatment Plan.    Treatment Goals:     Follow Safety Plan   Ask for more information, support and/or assistance as needed.  Follow up with providers/community supports as needed:   Report increases or changes in symptoms to staff.  Report any personal safety concerns to staff.   Take medications as prescribed.  Report medication changes and/or side effects to staff.  Attend and participate in groups as scheduled or notify staff if unable to do so.  Report any use of substances to staff as this may impact your symptoms and/or                      personal safety.  Notify staff if you have any other issues that need to be addressed. This may include              any current abuse / neglect / exploitation or other vulnerability.  Follow recommendations of your treatment team and discuss concerns if not in            agreement       Area of Treatment Focus:  Symptom Management, Personal Safety and Develop / Improve Independent Living Skills, wellness    Therapeutic Interventions/Treatment Strategies:  Support, Feedback, Safety Assessments, Problem Solving and Education    Response to Treatment Strategies:  Accepted Feedback, Gave Feedback, Listened, Attentive and Alert    Name of Group:  Weekend Planning    Description and Outcome:  Group spent time discussing plans for the weekend and reflecting on this weeks programming. Patients were to fill out worksheet that identified the skills they learned this week and ways they have implemented them into their lives. They also planned activities for their weekend. Group members were encouraged to  give others feedback and brainstorm strategies to manage symptoms.     Assessment  Appearance/ Mood: Calm behavior, range in affect, stable mood throughout group. Affect was consistent with mood.  Appropriate dress, well-groomed and was cooperative within group.   Thought Process: Linear and logical, productive and goal directed. Contributed to group conversation.   Behavior: Cooperative, interacted within the group, listened and was respectful to others  Areas for growth: Patient would benefit from the application of skills to daily life and reporting to group after completion.      Treatment goal acknowledgement: Patient continues to work towards treatment plan goals and the skills learned today will benefit on recovery. Patient feels prepared going into the weekend, and has many tools that will benefit her recovery. Patient is trying to do a 5 minute walk and peer advice to break up unpacking so it is not overwhelming.         Is this a Weekly Review of the Progress on the Treatment Plan?  No

## 2018-03-02 NOTE — PROGRESS NOTES
Group Therapy Progress Notes     Client Initial Individualized Goals for Treatment:  Develop and apply coping skills to deal with the stress and anxiety at work, as well as home.         See Initial Treatment suggestions for the client during the time between Diagnostic Assessment and completion of the Master Individualized Treatment Plan.     Treatment Goals:      Follow Safety Plan   Ask for more information, support and/or assistance as needed.  Follow up with providers/community supports as needed:   Report increases or changes in symptoms to staff.  Report any personal safety concerns to staff.   Take medications as prescribed.  Report medication changes and/or side effects to staff.  Attend and participate in groups as scheduled or notify staff if unable to do so.  Report any use of substances to staff as this may impact your symptoms and/or  personal safety.  Notify staff if you have any other issues that need to be addressed. This may include any current abuse / neglect / exploitation or other vulnerability.  Follow recommendations of your treatment team and discuss concerns if not in  agreement        Area of Treatment Focus:  Symptom Management, Personal Safety, Community Resources/Discharge Planning, Abstinence/Relapse Prevention, Develop / Improve Independent Living Skills and Develop Socialization / Interpersonal Relationship Skills    Therapeutic Interventions/Treatment Strategies:  Support, Safety Assessments, Structured Activity and Education    Response to Treatment Strategies:  Accepted Feedback, Listened, Focused on Goals, Attentive and Accepted Support    Name of Group:  Relax and Review    Progress Note  Michelle was engaged in an experiential, mindfulness exercise where patients gained self awareness and relaxation by participating in a guided imagery with Regina Rico. She shared how she was going to self care over the weekend. Denies safety issues.      Is this a Weekly Review of the  Progress on the Treatment Plan?  No.

## 2018-03-06 ENCOUNTER — HOSPITAL ENCOUNTER (OUTPATIENT)
Dept: BEHAVIORAL HEALTH | Facility: CLINIC | Age: 68
End: 2018-03-06
Attending: PSYCHIATRY & NEUROLOGY
Payer: COMMERCIAL

## 2018-03-06 PROCEDURE — H0035 MH PARTIAL HOSP TX UNDER 24H: HCPCS

## 2018-03-06 NOTE — PROGRESS NOTES
Adult Mental Health Outpatient Group Therapy Progress Note     Client Initial Individualized Goals for Treatment: Develop and utilize coping strategies to deal with anxiety and depression    See Initial Treatment suggestions for the client during the time between Diagnostic Assessment and completion of the Master Individualized Treatment Plan.    Treatment Goals:         Area of Treatment Focus:  Symptom Management, Personal Safety, Community Resources/Discharge Planning, Develop / Improve Independent Living Skills and Develop Socialization / Interpersonal Relationship Skills    Therapeutic Interventions/Treatment Strategies:  Support, Feedback, Education and Cognitive Behavioral Therapy    Response to Treatment Strategies:  Accepted Feedback, Gave Feedback, Listened and Focused on Goals    Name of Group:  Group psychotherapy, Support Network Education Group  Description and Outcome:    Michelle participated in first and last groups of the day. Says she did not have a very good weekend. Says she slept all weekend. She say she did go to GA meeting, but then came home and ended up going to bed. Says she felt overwhelmed by seeing all the unpacked boxes and by thinking about how her  does not support her in getting things done. She sees herself as trying to do everything by herself. She gives an example that she was trying to get ready to go today and he had been napping while she was trying to get a few things moved to storage areas. When he got up, she says, he wanted her to make him a sandwich though she was already doing a task. She asked him to help her do her task if ask if she was going to do that for him and he equivocated and ultimately, did not do what she asked. Discussed asserting her needs, being focused on what she needs to attend to, setting limits on what she cannot do. Also noted possibility of getting support if she finds herself wavering in her limits she needs. She noted that she did remarry  her  twice because she felt sorry for him trying to get along on his own.    Participated in viewing film on experience of depression, importance and role of support, importance of effective treatment. Discussed general ways to be supportive of people dealing with depression, anxiety, mental health problems. Completed exercise to articulate her support needs and symptoms with her .    Is this a Weekly Review of the Progress on the Treatment Plan?  No

## 2018-03-06 NOTE — PROGRESS NOTES
Adult Mental Health Partial Hospitalization Group Therapy Progress Note     Date: 3/06/18  Time: 2:00-2:50    Client Initial Individualized Goals for Treatment: Develop and apply coping skills to deal with the stress and anxiety at work, as well as home.     Treatment Goals from ITP:  1) Safety: Client will use coping plan for safety, as needed.  2) Symptom Management: Michelle will process her triggers for anxiety and panic and learn and practice skills around managing symptoms of depression and anxiety.   3) Life Skills:  Michelle will:    Learn, practice, generalize 2-3 skills/strategies that help promote lifestyle balance including self-care and leisure activities    Learn, practice, apply 2 sensory or mindfulness based coping skills for increased participation and enjoyment of meaningful activities and relationships  4) Discharge Preparation: Michelle will develop Aftercare Plan including:    Have a transition plan in place by discharge    Learn and verbalize understanding of relapse management.     Area of Treatment Focus:  Symptom Management, Personal Safety, Community Resources/Discharge Planning, Develop / Improve Independent Living Skills and Develop Socialization / Interpersonal Relationship Skills    Therapeutic Interventions/Treatment Strategies:  Support, Feedback, Safety Assessments, Structured Activity, Problem Solving, Clarification and Education    Response to Treatment Strategies:  Accepted Feedback, Gave Feedback, Listened, Focused on Goals, Attentive, Accepted Support and Alert    Name of Group:  OT life skills: goal integration    Description and Outcome: Michelle attended and participated in a structured  life skills group where intervention focuses on learning, practicing, and applying skills and strategies through psycho-education and structured experiential activities to manage mental health, improve symptoms, gain insight, and function in valued roles, routines, and relationships.    Group topic  today is goal integration (identify meaningful ways to apply learned skills and insight into their every day life). Psychoeducation around the concepts of values based goal setting rooted in self-compassion was provided. This was followed by definition of SMART goals (specific, measurable, achievable, relevant, time bound). Group members were led through structured process of 1) reviewing their treatment and/or SMART goals from last week 2) reflect on the positives or successes they experienced in the past week. 3) Identify 3 functional areas they want to focus on this week, 4) write a SMART goal for each of those. Lastly, 5) schedule their SMART goals into weekly planner and address any barriers with group.      Reflection: Meditated everyday, got to [employer] this past weekend, went to a GA meeting on Saturday.         Functional areas Michelle will focus on this week: self-compassion, physical activity, and negative self-talk      SMART Goals for this week:   1) Will walk on trail 5 minutes 3x this week   2) Be honest with how she feels and communicate that 3x..  3) Identify negative self-talk and flip it to self-kindness phrase 3x.      Michelle presents with a calm affect engages in the structured task independently.  Validation and support provided during her sharing time. She would benefit from additional opportunities to practice and implement content from this session.     Is this a Weekly Review of the Progress on the Treatment Plan?  No.

## 2018-03-06 NOTE — PROGRESS NOTES
Adult Mental Health Outpatient Group Therapy Progress Note     Client Initial Individualized Goals for Treatment: Develop and apply coping skills to deal with the stress and anxiety at work, as well as home.      Treatment Goals from ITP:  1) Safety: Client will use coping plan for safety, as needed.  2) Symptom Management: Michelle will process her triggers for anxiety and panic and learn and practice skills around managing symptoms of depression and anxiety.   3) Life Skills:  Michelle will:    Learn, practice, generalize 2-3 skills/strategies that help promote lifestyle balance including self-care and leisure activities    Learn, practice, apply 2 sensory or mindfulness based coping skills for increased participation and enjoyment of meaningful activities and relationships  4) Discharge Preparation: Michelle will develop Aftercare Plan including:    Have a transition plan in place by discharge    Learn and verbalize understanding of relapse management.     Area of Treatment Focus:  Symptom Management, Personal Safety and Develop / Improve Independent Living Skills    Therapeutic Interventions/Treatment Strategies:  Support, Feedback, Safety Assessments, Clarification, Education and weighted materials and sensory modalities    Response to Treatment Strategies:  Accepted Feedback, Gave Feedback, Listened, Accepted Support and Alert    Name of Group:  OT Life Skills      Description and Outcome:  Michelle actively participated in a psycho-educational group focused on learning about our sensory system and how sensory input can be helpful in managing symptoms and stress.  Michelle was quiet during group but appeared attentive to the discussion.  Affect was constricted.  Client verbalized understanding of session content by identifying some coping skills she can use to better manage her symptoms.     Is this a Weekly Review of the Progress on the Treatment Plan?  No

## 2018-03-07 ENCOUNTER — HOSPITAL ENCOUNTER (OUTPATIENT)
Dept: BEHAVIORAL HEALTH | Facility: CLINIC | Age: 68
End: 2018-03-07
Attending: PSYCHIATRY & NEUROLOGY
Payer: COMMERCIAL

## 2018-03-07 PROCEDURE — H0035 MH PARTIAL HOSP TX UNDER 24H: HCPCS

## 2018-03-07 NOTE — PROGRESS NOTES
Group Therapy Progress Notes     Client Initial Individualized Goals for Treatment:  Develop and apply coping skills to deal with the stress and anxiety at work, as well as home.           See Initial Treatment suggestions for the client during the time between Diagnostic Assessment and completion of the Master Individualized Treatment Plan.      Treatment Goals:       Follow Safety Plan   Ask for more information, support and/or assistance as needed.  Follow up with providers/community supports as needed:   Report increases or changes in symptoms to staff.  Report any personal safety concerns to staff.   Take medications as prescribed.  Report medication changes and/or side effects to staff.  Attend and participate in groups as scheduled or notify staff if unable to do so.  Report any use of substances to staff as this may impact your symptoms and/or  personal safety.  Notify staff if you have any other issues that need to be addressed. This may include any current abuse / neglect / exploitation or other vulnerability.  Follow recommendations of your treatment team and discuss concerns if not in  agreement      Area of Treatment Focus:  Symptom Management, Personal Safety, Community Resources/Discharge Planning, Abstinence/Relapse Prevention, Develop / Improve Independent Living Skills and Develop Socialization / Interpersonal Relationship Skills    Therapeutic Interventions/Treatment Strategies:  Support, Safety Assessments, Structured Activity and Education    Response to Treatment Strategies:  Accepted Feedback, Listened, Focused on Goals, Attentive and Accepted Support    Name of Group:  Psychotherapy Group and Aftercare Planning    Progress Note  Michelle reports depressed mood. Denies S/I or safety issues. Her  attended the education support group last night. She didn't feel it made a difference in how her  supports her. In Aftercare Planning, Michelle named her needs on coping cards which is a  relapse prevention tool.  Michelle is open and engaged in groups. She is using symptom management tools. She will continue in the partial program.            Is this a Weekly Review of the Progress on the Treatment Plan?  No

## 2018-03-07 NOTE — PROGRESS NOTES
Adult Mental Health   Mental Health Outpatient Program Progress Note   Group Time: 11:00-11:50  Client Initial Individualized Goals for Treatment:  Develop and apply coping skills to deal with the stress and anxiety at work, as well as home.       See Initial Treatment suggestions for the client during the time between Diagnostic Assessment and completion of the Master Individualized Treatment Plan.    Treatment Goals:     Follow Safety Plan   Ask for more information, support and/or assistance as needed.  Follow up with providers/community supports as needed:   Report increases or changes in symptoms to staff.  Report any personal safety concerns to staff.   Take medications as prescribed.  Report medication changes and/or side effects to staff.  Attend and participate in groups as scheduled or notify staff if unable to do so.  Report any use of substances to staff as this may impact your symptoms and/or                      personal safety.  Notify staff if you have any other issues that need to be addressed. This may include              any current abuse / neglect / exploitation or other vulnerability.  Follow recommendations of your treatment team and discuss concerns if not in            agreement       Area of Treatment Focus:  Symptom Management, Community Resources/Discharge Planning, Develop / Improve Independent Living Skills and Physical Health     Therapeutic Interventions/Treatment Strategies:  Structured Activity, Problem Solving and Education    Response to Treatment Strategies:  Accepted Feedback, Gave Feedback, Listened, Attentive and Alert    Name of Group:  Medication Education    Description and Outcome:  The game [employer] was used to facilitate discussion about medication safety. Topics on antidepressants, medication safety, side effects, pharmacy visits and neuroleptics were discussed.    Assessment  Appearance/ Mood: Calm behavior, range in affect, stable mood throughout group. Affect was  consistent with mood.  Appropriate dress, well-groomed and was cooperative within group.   Thought Process: Linear and logical, productive and goal directed. Contributed to group conversation.   Behavior: Cooperative, interacted within the group, listened and was respectful to others    Understanding of the lesson:  Patient participated in group and answered questions correctly.     Is this a Weekly Review of the Progress on the Treatment Plan?  No

## 2018-03-07 NOTE — PROGRESS NOTES
[facility]   Dr. Burns's Psychiatric Progress Note  2018      Patient:  Michelle Michael   Medical Record Number:  [id number]  :  1950          Interim History:   The patient's care was discussed with the treatment team and chart notes were reviewed.  66y/o woman from [place] treated for depression and anxiety sees Moira Terry at [facility].  Pt was referred by a norbert who went thru this program.  Pt has compulsive gambling.    Pt comes in with worsening anxiety.  Not gambling since gambling treatment last year.  Attends GA and has a sponsor.  Has lots of work stress.  Put on bogus corrective action plan at work.  They won't accomodate her schedule.  Lamictal was added last week.  Teri Dejesus recommended pt stop Doxepin.  Finished it last night.  In jeopardy of getting fired.     Psychiatric ROS:  Mood: depressed and anxious  Sleep: too much  Appetite:  better last few days  Eating:  normal  Energy Level:LOW;  Not walking for exercise  Concentration/Memory Problems:    intermittent;   spaces out at times;   Suicidal Thoughts:Yes intermittent passive SI;  No plan or intent;  Contracts no self harm;    Homicidal Thoughts:No  Psychotic Symptoms: No  Medication Side Effects:No  Medication Compliance:Yes   Physical Complaints:negative         Medications:     Current Outpatient Prescriptions   Medication Sig     METOPROLOL TARTRATE PO Take 50 mg by mouth daily     ASPIRIN NOT PRESCRIBED (INTENTIONAL) continuous prn for other Please choose reason not prescribed, below     ASPIRIN PO Take 81 mg by mouth daily     VITAMIN D, CHOLECALCIFEROL, PO Take 2,000 Units by mouth daily     OMEPRAZOLE PO Take 20 mg by mouth every morning     RaNITidine HCl (ZANTAC PO) Take 150 mg by mouth daily     DULoxetine HCl (CYMBALTA PO) Take 60 mg by mouth daily     LAMICTAL Take 50mg/d x 1 week then increase every week by 25mg until reaching target dose of 60 mg/d     No current  facility-administered medications for this encounter.              Allergies:     Allergies   Allergen Reactions     Hmg-Coa-R Inhibitors Muscle Pain (Myalgia)            Psychiatric Examination:   There were no vitals taken for this visit.  Weight is 0 lbs 0 oz  There is no height or weight on file to calculate BMI.    Appearance:  awake, alert and adequately groomed  Attitude:  cooperative  Eye Contact:  good  Mood:  anxious and depressed  Affect:  appropriate and in normal range  Speech:  clear, coherent  Psychomotor Behavior:  no evidence of tardive dyskinesia, dystonia, or tics  Throught Process:  logical  Associations:  no loose associations  Thought Content:  Intermittent suicidal ideation;  No plan or intent;  no homicidal ideation and no evidence of psychotic thought  Insight:  good  Judgement:  intact  Oriented to:  time, person, and place  Attention Span and Concentration:  intact  Recent and Remote Memory:  intact  Gait:Normal    Risk/Potential for Dangerousness:  Multiple Active Diagnoses:HIGH  Self Care:HIGH  Suicide:MODERATE  Assault:LOW  Self Injurious Behaviors:LOW  Inappropriate Sexual Behavior:LOW         Labs:   No results found for this or any previous visit (from the past 24 hour(s)).     No results found for this or any previous visit (from the past 1008 hour(s)).      Impression:   This is a 67 year old female continues PHP for mood stabilization.  Mood has been up and down.           DIagnoses:     Axis I: F33.1 Major Depression Moderate and F41.1 LOAN rule out Bipolar     Axis II: none     Axis III: over weight, hx QT interval prolongation,      Axis IV:  Severe related to work stressors psychosocial stressors     Axis V: Global Assessment of Functionin           Plan:     Continue PHP.    Increase Lamictal to 50 mg/d and titrate weekly  Has intake 3/22/18 at [facility] for IOP.      Cullen Burns MD

## 2018-03-08 ENCOUNTER — HOSPITAL ENCOUNTER (OUTPATIENT)
Dept: BEHAVIORAL HEALTH | Facility: CLINIC | Age: 68
End: 2018-03-08
Attending: PSYCHIATRY & NEUROLOGY
Payer: COMMERCIAL

## 2018-03-08 PROCEDURE — H0035 MH PARTIAL HOSP TX UNDER 24H: HCPCS

## 2018-03-08 NOTE — PROGRESS NOTES
Adult Mental Health Partial Hospitalization Group Therapy Progress Note     Date: 3/07/18  Time: 1:00-1:50    Client Initial Individualized Goals for Treatment: Develop and apply coping skills to deal with the stress and anxiety at work, as well as home.     Treatment Goals from ITP:  1) Safety: Client will use coping plan for safety, as needed.  2) Symptom Management: Michelle will process her triggers for anxiety and panic and learn and practice skills around managing symptoms of depression and anxiety.   3) Life Skills:  Michelle will:    Learn, practice, generalize 2-3 skills/strategies that help promote lifestyle balance including self-care and leisure activities    Learn, practice, apply 2 sensory or mindfulness based coping skills for increased participation and enjoyment of meaningful activities and relationships  4) Discharge Preparation: Michelle will develop Aftercare Plan including:    Have a transition plan in place by discharge    Learn and verbalize understanding of relapse management.     Area of Treatment Focus:  Symptom Management, Personal Safety, Community Resources/Discharge Planning, Develop / Improve Independent Living Skills and Develop Socialization / Interpersonal Relationship Skills    Therapeutic Interventions/Treatment Strategies:  Support, Feedback, Safety Assessments, Structured Activity, Problem Solving, Clarification and Education    Response to Treatment Strategies:  Accepted Feedback, Gave Feedback, Listened, Focused on Goals, Attentive, Accepted Support and Alert    Name of Group:  OT life skills: clinic    Description and Outcome: Michelle attended and participated in a structured  life skills group where intervention focuses on learning, practicing, and applying skills and strategies through psycho-education and structured experiential activities to manage mental health, improve symptoms, gain insight, and function in valued roles, routines, and relationships.    Group topic today is on  benefits of meaningful/purposeful activity that supports psychiatric recovery. Michelle initiates all aspects of her chosen activity that she finds calming and grounding.  Validation and support provided during her sharing time, endorsing low energy. She would benefit from additional opportunities to practice and implement content from this session.  Addressed goal number 3 today.        Is this a Weekly Review of the Progress on the Treatment Plan?  Yes.      Are Treatment Plan Goals being addressed?  Yes, continue treatment goals      Are Treatment Plan Strategies to Address Goals Effective?  Yes, continue treatment strategies      Are there any current contracts in place?  No

## 2018-03-09 ENCOUNTER — HOSPITAL ENCOUNTER (OUTPATIENT)
Dept: BEHAVIORAL HEALTH | Facility: CLINIC | Age: 68
End: 2018-03-09
Attending: PSYCHIATRY & NEUROLOGY
Payer: COMMERCIAL

## 2018-03-09 PROCEDURE — H0035 MH PARTIAL HOSP TX UNDER 24H: HCPCS

## 2018-03-09 NOTE — PROGRESS NOTES
Adult Mental Health Outpatient Group Therapy Progress Note     Group Time: 11:00-11:50  Client Initial Individualized Goals for Treatment:  Develop and apply coping skills to deal with the stress and anxiety at work, as well as home.       See Initial Treatment suggestions for the client during the time between Diagnostic Assessment and completion of the Master Individualized Treatment Plan.    Treatment Goals:     Follow Safety Plan   Ask for more information, support and/or assistance as needed.  Follow up with providers/community supports as needed:   Report increases or changes in symptoms to staff.  Report any personal safety concerns to staff.   Take medications as prescribed.  Report medication changes and/or side effects to staff.  Attend and participate in groups as scheduled or notify staff if unable to do so.  Report any use of substances to staff as this may impact your symptoms and/or                      personal safety.  Notify staff if you have any other issues that need to be addressed. This may include              any current abuse / neglect / exploitation or other vulnerability.  Follow recommendations of your treatment team and discuss concerns if not in            agreement       Area of Treatment Focus:  Symptom Management, Personal Safety and Develop / Improve Independent Living Skills, wellness    Therapeutic Interventions/Treatment Strategies:  Support, Feedback, Safety Assessments, Problem Solving and Education    Response to Treatment Strategies:  Accepted Feedback, Gave Feedback, Listened, Attentive and Alert    Name of Group:  Weekend Planning    Description and Outcome:  Group spent time discussing plans for the weekend and reflecting on this weeks programming. Patients were to fill out worksheet that identified the skills they learned this week and ways they have implemented them into their lives. They also planned activities for their weekend. Group members were encouraged to give  others feedback and brainstorm strategies to manage symptoms.     Assessment  Appearance/ Mood: Calm behavior, range in affect, stable mood throughout group. Affect was consistent with mood.  Appropriate dress, well-groomed and was cooperative within group.   Thought Process: Linear and logical, productive and goal directed. Contributed to group conversation.   Behavior: Cooperative, interacted within the group, listened and was respectful to others  Areas for growth: Patient would benefit from the application of skills to daily life and reporting to group after completion.  Patient would benefit from finding ways to stay out of bed.     Treatment goal acknowledgement: Patient continues to work towards treatment plan goals and the skills learned today will benefit on recovery.        Is this a Weekly Review of the Progress on the Treatment Plan?  No

## 2018-03-09 NOTE — PROGRESS NOTES
Adult Mental Health Partial Hospitalization Group Therapy Progress Note     Date: 3/08/18  Time: 1:00-1:50    Client Initial Individualized Goals for Treatment: Develop and apply coping skills to deal with the stress and anxiety at work, as well as home.     Treatment Goals from ITP:  1) Safety: Client will use coping plan for safety, as needed.  2) Symptom Management: Michelle will process her triggers for anxiety and panic and learn and practice skills around managing symptoms of depression and anxiety.   3) Life Skills:  Michelle will:    Learn, practice, generalize 2-3 skills/strategies that help promote lifestyle balance including self-care and leisure activities    Learn, practice, apply 2 sensory or mindfulness based coping skills for increased participation and enjoyment of meaningful activities and relationships  4) Discharge Preparation: Michelle will develop Aftercare Plan including:    Have a transition plan in place by discharge    Learn and verbalize understanding of relapse management.     Area of Treatment Focus:  Symptom Management, Personal Safety, Community Resources/Discharge Planning, Develop / Improve Independent Living Skills and Develop Socialization / Interpersonal Relationship Skills    Therapeutic Interventions/Treatment Strategies:  Support, Feedback, Safety Assessments, Structured Activity, Problem Solving, Clarification and Education    Response to Treatment Strategies:  Accepted Feedback, Gave Feedback, Listened, Focused on Goals, Attentive, Accepted Support and Alert    Name of Group:  OT life skills: communication lab    Description and Outcome: Michelle attended and participated in a structured  life skills group where intervention focuses on learning, practicing, and applying skills and strategies through psycho-education and structured experiential activities to manage mental health, improve symptoms, gain insight, and function in valued roles, routines, and relationships.    Group topic  "today is on communication, with a focus on communication styles and developing skill of \"whole messages\". Psycho-education on communication styles, preferences, and discussion around group members experiences. Introduction to \"i messages\" (feelings, observations, needs) followed by the process of developing \"whole messages\" (observations, thoughts, feelings, needs). Group members had opportunity to do one group message around a situation provided by writer, they then worked independently on their own practicing the skill around two different personal situations. Michelle works hard at developing her whole messages and processes with group. She continues to report she has tried many things and she just cannot change her . We reviewed and discussed importance of self-compassion and self-respecting behavior, peers shared that even if he does not respond well, at least she is expressing herself and asserting her perspective and needs.  She would benefit from additional support and opportunities to practice and implement content from this session.  Addressed goal number 2 today.        Is this a Weekly Review of the Progress on the Treatment Plan?  No.                 "

## 2018-03-09 NOTE — PROGRESS NOTES
Group Therapy Progress Notes     Client Initial Individualized Goals for Treatment:  Develop and apply coping skills to deal with the stress and anxiety at work, as well as home.           See Initial Treatment suggestions for the client during the time between Diagnostic Assessment and completion of the Master Individualized Treatment Plan.      Treatment Goals:       Follow Safety Plan   Ask for more information, support and/or assistance as needed.  Follow up with providers/community supports as needed:   Report increases or changes in symptoms to staff.  Report any personal safety concerns to staff.   Take medications as prescribed.  Report medication changes and/or side effects to staff.  Attend and participate in groups as scheduled or notify staff if unable to do so.  Report any use of substances to staff as this may impact your symptoms and/or  personal safety.  Notify staff if you have any other issues that need to be addressed. This may include any current abuse / neglect / exploitation or other vulnerability.  Follow recommendations of your treatment team and discuss concerns if not in  agreement    Area of Treatment Focus:  Symptom Management, Personal Safety, Community Resources/Discharge Planning, Abstinence/Relapse Prevention, Develop / Improve Independent Living Skills and Develop Socialization / Interpersonal Relationship Skills    Therapeutic Interventions/Treatment Strategies:  Support, Safety Assessments, Structured Activity and Education    Response to Treatment Strategies:  Accepted Feedback, Listened, Focused on Goals, Attentive and Accepted Support    Name of Group:  Psychotherapy Group and Self Awareness    Progress Note  Michelle reports less depressed mood yet continues to have anxious mood related to work. Denies S/I or safety issues. Michelle attended a GA meeting last night. She visited with her grandsons. Michelle was able to process in group the interpersonal relationship dynamics with her   who tends to be overcritical of others. He hopes to change this during their time together. In Self Awareness, Michelle was able to identify her strengths and create a personal affirmation statement to cultivate self esteem and use as a cognitive behavioral tool.  She continues to be open and engaged in groups. Michelle continues to use coping skills for symptom management strategies. Michelle will continue in the partial program.           Is this a Weekly Review of the Progress on the Treatment Plan?  No

## 2018-03-09 NOTE — PROGRESS NOTES
Group Therapy Progress Notes     Area of Treatment Focus:  Symptom Management and Develop Socialization / Interpersonal Relationship Skills    Therapeutic Interventions/Treatment Strategies:  Support, Feedback, Structured Activity, Clarification and Education    Response to Treatment Strategies:  Accepted Feedback, Gave Feedback, Listened, Focused on Goals, Attentive, Accepted Support and Alert    Name of Group:  Mental  Health management, self awareness    Progress Note  Mental Health Management: The group participated in a structured, psychoeducational discussion and activity comparing and contrasting self esteem and self compassion.  Self esteem was presented as a useful, but limiting construct, as if is prone to change with circumstances and relies on comparisons to others.  On the other hand, self compassion can be described as breathing the self kindly, especially during struggle.  Self compassion facilitates connection to others.  Handouts, including exercises to practise self compassion, were given. Michelle  demonstrated understanding of session content by interacting with peers to discuss the material presented and sharing personal experiences relevant to the topic.     Self Awareness: The group was provided with a guided breathing and warmup structure with focus on increasing self awareness and on providing an embodied experience.  Discussion included the importance of listening to body cues as a way of identifying emotion as well as accompanying needs and wants, and as a way of practising self compassion, authenticity, mindfulness, self expression,  and connection to other.  The group utilized play to explore Bainbridge Cohen's satisfaction cycle.  A discussion followed addressing the importance of movement, creativity and self expression. Michelle demonstrated understanding of topic by participating in experiential.  She reported feeling more relaxed at end of group.          Is this a Weekly Review of the  Progress on the Treatment Plan?  No

## 2018-03-09 NOTE — PROGRESS NOTES
[facility]   Dr. Burns's Psychiatric Progress Note  2018      Patient:  Michelle Michael   Medical Record Number:  [id number]  :  1950          Interim History:   The patient's care was discussed with the treatment team and chart notes were reviewed.  66y/o woman from [place] treated for depression and anxiety sees Moira Terry at [facility].  Pt was referred by a norbert who went thru this program.  Pt has compulsive gambling.    Pt comes in with worsening anxiety.  Not gambling since gambling treatment last year.  Attends [facility] and has a sponsor.  Has lots of work stress.  Put on bogus corrective action plan at work.  They won't accomodate her schedule.  Lamictal was added last week.  In jeopardy of getting fired.     Pt has mixed emotions today.  Has relationship issues with her . She's telling him how she feels.  That's not well-received.  Enjoyed [facility] last night ages 8 and 11.  Interviewed for another job. That went well.      Psychiatric ROS:  Mood: depressed (but a little less) and anxious (especially re: work)  Sleep: good last night  Appetite:  ok  Eating:  normal  Energy Level:  LOW;  Not walking for exercise  Concentration/Memory Problems:    intermittent;   spaces out at times;   Suicidal Thoughts: none x last couple days    Homicidal Thoughts:No  Psychotic Symptoms: No  Medication Side Effects:No  Medication Compliance:Yes   Physical Complaints:negative         Medications:     Current Outpatient Prescriptions   Medication Sig     METOPROLOL TARTRATE PO Take 50 mg by mouth daily     ASPIRIN NOT PRESCRIBED (INTENTIONAL) continuous prn for other Please choose reason not prescribed, below     ASPIRIN PO Take 81 mg by mouth daily     VITAMIN D, CHOLECALCIFEROL, PO Take 2,000 Units by mouth daily     OMEPRAZOLE PO Take 20 mg by mouth every morning     RaNITidine HCl (ZANTAC PO) Take 150 mg by mouth daily     DULoxetine HCl (CYMBALTA PO) Take  60 mg by mouth daily     LAMICTAL Take 50mg/d x 1 week then increase every week by 25mg until reaching target dose of 60 mg/d     No current facility-administered medications for this encounter.              Allergies:     Allergies   Allergen Reactions     Hmg-Coa-R Inhibitors Muscle Pain (Myalgia)            Psychiatric Examination:   There were no vitals taken for this visit.  Weight is 0 lbs 0 oz  There is no height or weight on file to calculate BMI.    Appearance:  awake, alert and adequately groomed  Attitude:  cooperative  Eye Contact:  good  Mood:  anxious and depressed  Affect:  anxious  Speech:  clear, coherent  Psychomotor Behavior:  no evidence of tardive dyskinesia, dystonia, or tics  Throught Process:  logical  Associations:  no loose associations  Thought Content:  Intermittent suicidal ideation;  No plan or intent;  no homicidal ideation and no evidence of psychotic thought  Insight:  good  Judgement:  intact  Oriented to:  time, person, and place  Attention Span and Concentration:  intact  Recent and Remote Memory:  intact  Gait:Normal    Risk/Potential for Dangerousness:  Multiple Active Diagnoses:HIGH  Self Care:HIGH  Suicide:MODERATE  Assault:LOW  Self Injurious Behaviors:LOW  Inappropriate Sexual Behavior:LOW         Labs:   No results found for this or any previous visit (from the past 24 hour(s)).     No results found for this or any previous visit (from the past 1008 hour(s)).      Impression:   This is a 67 year old female continues PHP for mood stabilization.  Mood has been up and down but improving.           DIagnoses:     Axis I: F33.1 Major Depression Moderate and F41.1 LOAN rule out Bipolar     Axis II: none     Axis III: over weight, hx QT interval prolongation,      Axis IV:  Severe related to work stressors psychosocial stressors     Axis V: Global Assessment of Functionin           Plan:     Continue PHP for one more week.    Increase Lamictal to 50 mg/d and titrate  weekly  Has intake 3/15/18 at [facility] for IOP and hopes to start on 3/19/18    Cullen Burns MD

## 2018-03-11 NOTE — PROGRESS NOTES
Adult Mental Health Outpatient Group Therapy Progress Note     Client Initial Individualized Goals for Treatment: Develop and utilize coping strategies to deal with anxiety and depression    See Initial Treatment suggestions for the client during the time between Diagnostic Assessment and completion of the Master Individualized Treatment Plan.    Treatment Goals:         Area of Treatment Focus:  Symptom Management, Personal Safety, Community Resources/Discharge Planning, Develop / Improve Independent Living Skills and Develop Socialization / Interpersonal Relationship Skills    Therapeutic Interventions/Treatment Strategies:  Support, Feedback, Education and Cognitive Behavioral Therapy    Response to Treatment Strategies:  Accepted Feedback, Gave Feedback, Listened and Focused on Goals    Name of Group:  Relax and review    Description and Outcome:    Discussed importance of intentional physical relaxation practice for management of anxiety and as a way to reduce stress to help with depression. Practiced three types (progressive, measured breathing, guided imagery) of physical relaxation, discussed various experiences with these practices and implication for how to do twice daily practice. Client demonstrated understanding of session by identifying examples of how content applied to self, asking clarifying questions, providing feedback to other group members who were addressing concepts.    Is this a Weekly Review of the Progress on the Treatment Plan?  No

## 2018-03-12 ENCOUNTER — HOSPITAL ENCOUNTER (OUTPATIENT)
Dept: BEHAVIORAL HEALTH | Facility: CLINIC | Age: 68
End: 2018-03-12
Attending: PSYCHIATRY & NEUROLOGY
Payer: COMMERCIAL

## 2018-03-12 PROCEDURE — H0035 MH PARTIAL HOSP TX UNDER 24H: HCPCS

## 2018-03-12 NOTE — PROGRESS NOTES
Adult Mental Health   Mental Health Outpatient Program Progress Note     Group Time: 11:00-11:50    Client Initial Individualized Goals for Treatment:  Develop and apply coping skills to deal with the stress and anxiety at work, as well as home.       See Initial Treatment suggestions for the client during the time between Diagnostic Assessment and completion of the Master Individualized Treatment Plan.    Treatment Goals:     Follow Safety Plan   Ask for more information, support and/or assistance as needed.  Follow up with providers/community supports as needed:   Report increases or changes in symptoms to staff.  Report any personal safety concerns to staff.   Take medications as prescribed.  Report medication changes and/or side effects to staff.  Attend and participate in groups as scheduled or notify staff if unable to do so.  Report any use of substances to staff as this may impact your symptoms and/or                      personal safety.  Notify staff if you have any other issues that need to be addressed. This may include              any current abuse / neglect / exploitation or other vulnerability.  Follow recommendations of your treatment team and discuss concerns if not in            agreement       Area of Treatment Focus:  Symptom Management, Personal Safety, Develop / Improve Independent Living Skills and Physical Health     Therapeutic Interventions/Treatment Strategies:  Support, Feedback, Problem Solving, Education and Cognitive Behavioral Therapy    Response to Treatment Strategies:  Accepted Feedback, Gave Feedback, Listened, Attentive and Alert    Name of Group: Stress Managment    Description and Outcome:  Patient filled out worksheet that asked for their thoughts, feelings and behaviors related to stress. We looked at the relationship between them.  We then discussed the importance of stress and why we are created to experience stress. We recognize why stress has become a problem and talked  about ways to build a firm foundation so that we may take on stress that comes our way. Patients are encouraged to write down things they can do for their mental & physical routines that will build the foundation for  stress. We then discussed the role of our thoughts and how they affect our behavioral outcomes.  We discussed strategies to reduce stress such as exercise, creative hobbies and sleep.   Assessment  Appearance/ Mood: Calm behavior, range in affect, stable mood throughout group. Affect was consistent with mood.  Appropriate dress, well-groomed and was cooperative within group.   Thought Process: Linear and logical, productive and goal directed. Contributed to group conversation.   Behavior: Cooperative, interacted within the group, listened and was respectful to others    Understanding of the lesson: Patient stated she wanted to work on self care to manage stress.         Is this a Weekly Review of the Progress on the Treatment Plan?  No

## 2018-03-12 NOTE — PROGRESS NOTES
[facility]   Dr. Burns's Psychiatric Progress Note  2018      Patient:  Michelle Michael   Medical Record Number:  [id number]  :  1950          Interim History:   The patient's care was discussed with the treatment team and chart notes were reviewed.  66y/o woman from [place] treated for depression and anxiety sees Moira Terry at [facility].  Pt was referred by a norbert who went thru this program.  Pt has compulsive gambling.    Pt comes in with worsening anxiety.  Not gambling since gambling treatment last year.  Attends GA and has a sponsor.  Has lots of work stress.  Put on bogus corrective action plan at work.  They won't accomodate her schedule.  Lamictal was added last week.  In jeopardy of getting fired.     Weekend was pretty good.  Didn't get outside to walk.      Psychiatric ROS:  Mood: pretty good  Sleep: often wakes around 3 am and then falls asleep later.  Napped too much this weekend.    Appetite:  ok  Eating:  Normal;    Energy Level:  LOW;  Not walking for exercise  Concentration/Memory Problems:  Not great  Suicidal Thoughts: none   Homicidal Thoughts:No  Psychotic Symptoms: No  Medication Side Effects:No  Medication Compliance:Yes   Physical Complaints:negative         Medications:     Current Outpatient Prescriptions   Medication Sig     METOPROLOL TARTRATE PO Take 50 mg by mouth daily     ASPIRIN NOT PRESCRIBED (INTENTIONAL) continuous prn for other Please choose reason not prescribed, below     ASPIRIN PO Take 81 mg by mouth daily     VITAMIN D, CHOLECALCIFEROL, PO Take 2,000 Units by mouth daily     OMEPRAZOLE PO Take 20 mg by mouth every morning     RaNITidine HCl (ZANTAC PO) Take 150 mg by mouth daily     DULoxetine HCl (CYMBALTA PO) Take 60 mg by mouth daily     LAMICTAL Take 75mg/d x 1 week then increase every week by 25mg until reaching target dose of 200 mg/d     No current facility-administered medications for this encounter.               Allergies:     Allergies   Allergen Reactions     Hmg-Coa-R Inhibitors Muscle Pain (Myalgia)            Psychiatric Examination:   There were no vitals taken for this visit.  Weight is 0 lbs 0 oz  There is no height or weight on file to calculate BMI.    Appearance:  awake, alert and adequately groomed  Attitude:  cooperative  Eye Contact:  good  Mood:  better  Affect:  brighter  Speech:  clear, coherent  Psychomotor Behavior:  no evidence of tardive dyskinesia, dystonia, or tics  Throught Process:  logical  Associations:  no loose associations  Thought Content:  Intermittent suicidal ideation;  No plan or intent;  no homicidal ideation and no evidence of psychotic thought  Insight:  good  Judgement:  intact  Oriented to:  time, person, and place  Attention Span and Concentration:  intact  Recent and Remote Memory:  intact  Gait:Normal    Risk/Potential for Dangerousness:  Multiple Active Diagnoses:HIGH  Self Care:HIGH  Suicide:MODERATE  Assault:LOW  Self Injurious Behaviors:LOW  Inappropriate Sexual Behavior:LOW         Labs:   No results found for this or any previous visit (from the past 24 hour(s)).     No results found for this or any previous visit (from the past 1008 hour(s)).      Impression:   This is a 67 year old female continues PHP for mood stabilization.  Mood has been up and down but improving.           DIagnoses:     Axis I: F33.1 Major Depression Moderate and F41.1 LOAN rule out Bipolar     Axis II: none     Axis III: over weight, hx QT interval prolongation,      Axis IV:  Severe related to work stressors psychosocial stressors     Axis V: Global Assessment of Functionin           Plan:     Continue PHP for one more week.    Increase Lamictal to 75 mg/d and titrate weekly  Has intake 3/15/18 at [facility] for IOP and hopes to start on 3/19/18    Cullen Burns MD

## 2018-03-12 NOTE — PROGRESS NOTES
Group Therapy Progress Notes     Area of Treatment Focus:  Symptom Management, Personal Safety, Community Resources/Discharge Planning, Abstinence/Relapse Prevention, Develop / Improve Independent Living Skills and Develop Socialization / Interpersonal Relationship Skills    Therapeutic Interventions/Treatment Strategies:  Support, Safety Assessments, Structured Activity and Education    Response to Treatment Strategies:  Accepted Feedback, Listened, Focused on Goals, Attentive and Accepted Support    Name of Group:  Self Talk    Progress Note  Michelle was engaged in self talk and participated in mapping the impact of a change physiologically, emotionally, cognitively, behaviorally and spiritually.  We discussed how changes in lifestyle and thought patterns impact the sympathetic nervous system. She shared and discussed her CBT map with a peer in group.           Is this a Weekly Review of the Progress on the Treatment Plan?    No

## 2018-03-12 NOTE — PROGRESS NOTES
"Adult Mental Health Outpatient Group Therapy Progress Note     Client Initial Individualized Goals for Treatment: Develop and utilize coping strategies to deal with anxiety and depression    See Initial Treatment suggestions for the client during the time between Diagnostic Assessment and completion of the Master Individualized Treatment Plan.    Treatment Goals:         Area of Treatment Focus:  Symptom Management, Personal Safety, Community Resources/Discharge Planning, Develop / Improve Independent Living Skills and Develop Socialization / Interpersonal Relationship Skills    Therapeutic Interventions/Treatment Strategies:  Support, Feedback, Education and Cognitive Behavioral Therapy    Response to Treatment Strategies:  Accepted Feedback, Gave Feedback, Listened and Focused on Goals    Name of Group:  Group psychotherapy, Mental Health Management    Description and Outcome:    Michelle participated in first two groups of the day. Participated in daily mindfulness practice. Says she had a \"so-so\" weekend. Says she did \"too much napping\" and then her sleep at night was poor. She went to see a movie with her family and enjoyed that. Also had lunch with her sister and that was \"nice\". Says her sister gave her a nice card. Says she told her  some \"white lies\" when answering his questions, to avoid conflict. She also acknowledges that this is troubling behavior in terms of her gambling addiction. She describes seeing herself as rebelling against the rules, that she is \"being a brat\". Discussed this in terms of having expectations about what she \"should\" do or what she \"should\" be and perhaps reacting against that. Considered alternative of making choices about how she wants or needs to proceed, with idea that she can make choices based on what she thinks the outcomes will be of various choices. Also noted that this allows her to make choices based on her values rather than on things she \"should\" do. Client " demonstrated understanding of session by sharing her insights, awarenesses about her thoughts and feelings, identifying options for coping and reducing stress    Discussed change and transition and considered the hero's journey as a model of personal development in times of change and transition, identified ways to see the discomfort of growth in personal experience as normal, not pathological. Also discussed importance of support and self-acceptance in that process.  Client demonstrated understanding of session by identifying examples of how content applied to self, asking clarifying questions, providing feedback to other group members who were addressing concepts.    Is this a Weekly Review of the Progress on the Treatment Plan?  No

## 2018-03-13 ENCOUNTER — HOSPITAL ENCOUNTER (OUTPATIENT)
Dept: BEHAVIORAL HEALTH | Facility: CLINIC | Age: 68
End: 2018-03-13
Attending: PSYCHIATRY & NEUROLOGY
Payer: COMMERCIAL

## 2018-03-13 PROCEDURE — H0035 MH PARTIAL HOSP TX UNDER 24H: HCPCS

## 2018-03-13 NOTE — PROGRESS NOTES
Adult Mental Health Partial Hospitalization Group Therapy Progress Note     Client Initial Individualized Goals for Treatment: Develop and apply coping skills to deal with the stress and anxiety at work, as well as home.      Treatment Goals from ITP:  1) Safety: Client will use coping plan for safety, as needed.  2) Symptom Management: Michelle will process her triggers for anxiety and panic and learn and practice skills around managing symptoms of depression and anxiety.   3) Life Skills:  Michelle will:    Learn, practice, generalize 2-3 skills/strategies that help promote lifestyle balance including self-care and leisure activities    Learn, practice, apply 2 sensory or mindfulness based coping skills for increased participation and enjoyment of meaningful activities and relationships  4) Discharge Preparation: Michelle will develop Aftercare Plan including:    Have a transition plan in place by discharge    Learn and verbalize understanding of relapse management.     Area of Treatment Focus:  Symptom Management, Personal Safety and Develop / Improve Independent Living Skills    Therapeutic Interventions/Treatment Strategies:  Support, Feedback, Safety Assessments, Structured Activity, Problem Solving and Education    Response to Treatment Strategies:  Accepted Feedback, Gave Feedback, Listened, Focused on Goals, Attentive, Accepted Support and Alert    Name of Group:  OT Life skills     Description and Outcome:  Michelle actively participated in a discussion and written activity focused on tuning into their state of alertness, body sensations, and emotions and how to make changes when needed.  Michelle reported difficulty knowing what she is feeling and not knowing how to manage her emotions.  Shared examples for her life with the group and was open to feedback and suggestions from others.  Michelle also completed a proficiency rating scale for self regulation skills and was able to identify some progress she has made  while in the program and more work she needs to do.  Client verbalized understanding of session content by applying information presented to her situation and being open to ideas.  Client would benefit from additional opportunities to practice and implement content from this session.    Is this a Weekly Review of the Progress on the Treatment Plan?  No

## 2018-03-13 NOTE — PROGRESS NOTES
Group Therapy Progress Notes     Client Initial Individualized Goals for Treatment:  Develop and apply coping skills to deal with the stress and anxiety at work, as well as home.           See Initial Treatment suggestions for the client during the time between Diagnostic Assessment and completion of the Master Individualized Treatment Plan.      Treatment Goals:       Follow Safety Plan   Ask for more information, support and/or assistance as needed.  Follow up with providers/community supports as needed:   Report increases or changes in symptoms to staff.  Report any personal safety concerns to staff.   Take medications as prescribed.  Report medication changes and/or side effects to staff.  Attend and participate in groups as scheduled or notify staff if unable to do so.  Report any use of substances to staff as this may impact your symptoms and/or  personal safety.  Notify staff if you have any other issues that need to be addressed. This may include any current abuse / neglect / exploitation or other vulnerability.  Follow recommendations of your treatment team and discuss concerns if not in  agreement    Area of Treatment Focus:  Symptom Management, Personal Safety, Community Resources/Discharge Planning, Abstinence/Relapse Prevention, Develop / Improve Independent Living Skills and Develop Socialization / Interpersonal Relationship Skills    Therapeutic Interventions/Treatment Strategies:  Support, Safety Assessments, Structured Activity and Education    Response to Treatment Strategies:  Accepted Feedback, Listened, Focused on Goals, Attentive and Accepted Support    Name of Group:  Aftercare Planning    Progress Note  Michelle continued naming her needs on her coping cards which is part of her relapse prevention plan. She has an IOP intake at [email or web address] on Thursday, March 15. She would like 3/15/18 be the last day in the partial program.      Is this a Weekly Review of the Progress on the Treatment  Plan?  No

## 2018-03-13 NOTE — PROGRESS NOTES
Adult Mental Health Partial Hospitalization Group Therapy Progress Note     Date: 3/12/18  Time: 1:00-1:50    Client Initial Individualized Goals for Treatment: Develop and apply coping skills to deal with the stress and anxiety at work, as well as home.     Treatment Goals from ITP:  1) Safety: Client will use coping plan for safety, as needed.  2) Symptom Management: Michelle will process her triggers for anxiety and panic and learn and practice skills around managing symptoms of depression and anxiety.   3) Life Skills:  Michelle will:    Learn, practice, generalize 2-3 skills/strategies that help promote lifestyle balance including self-care and leisure activities    Learn, practice, apply 2 sensory or mindfulness based coping skills for increased participation and enjoyment of meaningful activities and relationships  4) Discharge Preparation: Michelle will develop Aftercare Plan including:    Have a transition plan in place by discharge    Learn and verbalize understanding of relapse management.     Area of Treatment Focus:  Symptom Management, Personal Safety, Community Resources/Discharge Planning, Develop / Improve Independent Living Skills and Develop Socialization / Interpersonal Relationship Skills    Therapeutic Interventions/Treatment Strategies:  Support, Feedback, Safety Assessments, Structured Activity, Problem Solving, Clarification and Education    Response to Treatment Strategies:  Accepted Feedback, Gave Feedback, Listened, Focused on Goals, Attentive, Accepted Support and Alert    Name of Group:  OT life skills: goal integration    Description and Outcome: Michelle attended and participated in a structured  life skills group where intervention focuses on learning, practicing, and applying skills and strategies through psycho-education and structured experiential activities to manage mental health, improve symptoms, gain insight, and function in valued roles, routines, and relationships.    Group topic  today is goal integration (identify meaningful ways to apply learned skills and insight into their every day life). Psychoeducation around the concepts of values based goal setting rooted in self-compassion was provided. This was followed by definition of SMART goals (specific, measurable, achievable, relevant, time bound). Group members were led through structured process of 1) reviewing their treatment and/or SMART goals from last week 2) reflect on the positives or successes they experienced in the past week. 3) Identify 3 functional areas they want to focus on this week, 4) write a SMART goal for each of those. Lastly, 5) schedule their SMART goals into weekly planner and address any barriers with group.      Reflection: Meditated, rested, lunch with her sister, attended GA 2x this past week.         Functional areas Michelle will focus on this week: forgiveness, distress tolerance, time management      SMART Goals for this week:   1) Clean bedroom by Wednesday.  2) Arrange schedule with Yahir for when grandkids come to stay.  3) [place] and journal around self-forgiveness 2x.      Michelle presents with a calm affect engages in the structured task independently.  Validation and support provided during her sharing time. She would benefit from additional opportunities to practice and implement content from this session.    Is this a Weekly Review of the Progress on the Treatment Plan?  No.

## 2018-03-14 ENCOUNTER — HOSPITAL ENCOUNTER (OUTPATIENT)
Dept: BEHAVIORAL HEALTH | Facility: CLINIC | Age: 68
End: 2018-03-14
Attending: PSYCHIATRY & NEUROLOGY
Payer: COMMERCIAL

## 2018-03-14 PROCEDURE — H0035 MH PARTIAL HOSP TX UNDER 24H: HCPCS

## 2018-03-14 NOTE — PROGRESS NOTES
Adult Mental Health   Mental Health Program    Progress Note     Group Time: 11:00-11:50    Client Initial Individualized Goals for Treatment:  Develop and apply coping skills to deal with the stress and anxiety at work, as well as home.       See Initial Treatment suggestions for the client during the time between Diagnostic Assessment and completion of the Master Individualized Treatment Plan.    Treatment Goals:     Follow Safety Plan   Ask for more information, support and/or assistance as needed.  Follow up with providers/community supports as needed:   Report increases or changes in symptoms to staff.  Report any personal safety concerns to staff.   Take medications as prescribed.  Report medication changes and/or side effects to staff.  Attend and participate in groups as scheduled or notify staff if unable to do so.  Report any use of substances to staff as this may impact your symptoms and/or                      personal safety.  Notify staff if you have any other issues that need to be addressed. This may include              any current abuse / neglect / exploitation or other vulnerability.  Follow recommendations of your treatment team and discuss concerns if not in            agreement       Area of Treatment Focus:  Symptom Management, Develop / Improve Independent Living Skills and Physical Health     Therapeutic Interventions/Treatment Strategies:  Support, Feedback, Structured Activity and Education    Response to Treatment Strategies:  Accepted Feedback, Gave Feedback, Listened, Attentive and Alert      Name of Group:  Sleep     Description and Outcome:   Case Studies were presented to patients. These case studies described characters who struggled with sleep. Patients discussed signs and symptoms of sleep deprivation present within each case study. Patients made suggestions for improved sleep within each story.  A hand-out on Sleep Hygiene was given and patients were encouraged to adapt healthy  changes to their routine .   Assessment  Appearance/ Mood: Calm behavior, range in affect, stable mood throughout group. Affect was consistent with mood.  Appropriate dress, well-groomed and was cooperative within group.   Thought Process: Linear and logical, productive and goal directed. Contributed to group conversation.   Behavior: Cooperative, interacted within the group, listened and was respectful to others  Areas for growth: Patient would benefit from the application of skills to daily life and reporting to group after completion.  Stated she wanted to decrease her time napping.     Treatment goal acknowledgement: Patient continues to work towards treatment plan goals and the skills learned today will benefit on recovery. Patient verbalized understanding by participating in group and added valuable insight to group discussion.     Is this a Weekly Review of the Progress on the Treatment Plan?  No

## 2018-03-14 NOTE — PROGRESS NOTES
Adult Mental Health Partial Hospitalization Group Therapy Progress Note     Date: 3/13/18  Time: 1:00-1:50    Client Initial Individualized Goals for Treatment: Develop and apply coping skills to deal with the stress and anxiety at work, as well as home.     Treatment Goals from ITP:  1) Safety: Client will use coping plan for safety, as needed.  2) Symptom Management: Michelle will process her triggers for anxiety and panic and learn and practice skills around managing symptoms of depression and anxiety.   3) Life Skills:  Michelle will:    Learn, practice, generalize 2-3 skills/strategies that help promote lifestyle balance including self-care and leisure activities    Learn, practice, apply 2 sensory or mindfulness based coping skills for increased participation and enjoyment of meaningful activities and relationships  4) Discharge Preparation: Michelle will develop Aftercare Plan including:    Have a transition plan in place by discharge    Learn and verbalize understanding of relapse management.     Area of Treatment Focus:  Symptom Management, Personal Safety, Community Resources/Discharge Planning, Develop / Improve Independent Living Skills and Develop Socialization / Interpersonal Relationship Skills    Therapeutic Interventions/Treatment Strategies:  Support, Feedback, Safety Assessments, Structured Activity, Problem Solving, Clarification and Education    Response to Treatment Strategies:  Accepted Feedback, Gave Feedback, Listened, Focused on Goals, Attentive, Accepted Support and Alert    Name of Group:  OT life skills: stress managment    Description and Outcome: Michelle attended and participated in a structured  life skills group where intervention focuses on learning, practicing, and applying skills and strategies through psycho-education and structured experiential activities to manage mental health, improve symptoms, gain insight, and function in valued roles, routines, and relationships.    Group topic  today is on stress management with an emphasis on understanding the definition and purpose of stress (physiologically) and breaking down the meaning into distress and eustress in our lives. Using the multi-factorial model of functioning and the metaphor of a stovetop, group members participated in a facilitated structured activity where they identify their valued roles, relationships, and endeavors then processed what action they need to take (turn them up, quiet them, turn them off, take off the stovetop) as well as what skills, strategies can help them take those actions and manage that stressor more effectively. Focus is on the  light of the stove which is self-compassion. Michelle fully engaged and was able to share and offer support and validation to her peers. Affect even, focused.  She would benefit from additional opportunities to practice and implement content from this session.    Is this a Weekly Review of the Progress on the Treatment Plan?  No.

## 2018-03-14 NOTE — PROGRESS NOTES
[facility]   Dr. Burns's Psychiatric Progress Note  2018      Patient:  Michelle Michael   Medical Record Number:  [id number]  :  1950          Interim History:   The patient's care was discussed with the treatment team and chart notes were reviewed.  66y/o woman from [place] treated for depression and anxiety sees Moira Terry at [facility].  Pt was referred by a norbert who went thru this program.  Pt has compulsive gambling.    Pt comes in with worsening anxiety.  Not gambling since gambling treatment last year.  Attends [employer] and has a sponsor.  Has lots of work stress.  Put on bogus corrective action plan at work.  They won't accomodate her schedule.  Lamictal was added last week.  In jeopardy of getting fired.     Pt had a good evening last night and feels a little bit better.      Psychiatric ROS:  Mood: pretty good;  Some anxiety;    Sleep:   Good last night;    Appetite:  ok  Eating:  Normal;    Energy Level:  LOW;  Not walking for exercise  Concentration/Memory Problems:  better  Suicidal Thoughts: none   Homicidal Thoughts:No  Psychotic Symptoms: No  Medication Side Effects:  Nausea;    Medication Compliance:Yes   Physical Complaints:negative         Medications:     Current Outpatient Prescriptions   Medication Sig     METOPROLOL TARTRATE PO Take 50 mg by mouth daily     ASPIRIN NOT PRESCRIBED (INTENTIONAL) continuous prn for other Please choose reason not prescribed, below     ASPIRIN PO Take 81 mg by mouth daily     VITAMIN D, CHOLECALCIFEROL, PO Take 2,000 Units by mouth daily     OMEPRAZOLE PO Take 20 mg by mouth every morning     RaNITidine HCl (ZANTAC PO) Take 150 mg by mouth daily     DULoxetine HCl (CYMBALTA PO) Take 60 mg by mouth daily     LAMICTAL Take 75mg/d x 1 week then increase every week by 25mg until reaching target dose of 200 mg/d     No current facility-administered medications for this encounter.              Allergies:      Allergies   Allergen Reactions     Hmg-Coa-R Inhibitors Muscle Pain (Myalgia)            Psychiatric Examination:   There were no vitals taken for this visit.  Weight is 0 lbs 0 oz  There is no height or weight on file to calculate BMI.    Appearance:  awake, alert and adequately groomed  Attitude:  cooperative  Eye Contact:  good  Mood:  better  Affect:  brighter  Speech:  clear, coherent  Psychomotor Behavior:  no evidence of tardive dyskinesia, dystonia, or tics  Throught Process:  logical  Associations:  no loose associations  Thought Content:  no suicidal ideation;  No plan or intent;  no homicidal ideation and no evidence of psychotic thought  Insight:  good  Judgement:  intact  Oriented to:  time, person, and place  Attention Span and Concentration:  intact  Recent and Remote Memory:  intact  Gait:Normal    Risk/Potential for Dangerousness:  Multiple Active Diagnoses:HIGH  Self Care:HIGH  Suicide:MODERATE  Assault:LOW  Self Injurious Behaviors:LOW  Inappropriate Sexual Behavior:LOW         Labs:   No results found for this or any previous visit (from the past 24 hour(s)).     No results found for this or any previous visit (from the past 1008 hour(s)).      Impression:   This is a 67 year old female continues PHP for mood stabilization.  Mood is improving.           DIagnoses:     Axis I: F33.1 Major Depression Moderate and F41.1 LOAN rule out Bipolar     Axis II: none     Axis III: over weight, hx QT interval prolongation,      Axis IV:  Severe related to work stressors psychosocial stressors     Axis V: Global Assessment of Functionin           Plan:     Continue PHP for one more week.    Increase Lamictal to 75 mg/d and titrate weekly.  Take at mealtime due to nausea.    Has intake 3/15/18 at [facility] for IOP and hopes to start on 3/19/18    Cullen Burns MD

## 2018-03-15 ENCOUNTER — HOSPITAL ENCOUNTER (OUTPATIENT)
Dept: BEHAVIORAL HEALTH | Facility: CLINIC | Age: 68
End: 2018-03-15
Attending: PSYCHIATRY & NEUROLOGY
Payer: COMMERCIAL

## 2018-03-15 PROCEDURE — H0035 MH PARTIAL HOSP TX UNDER 24H: HCPCS

## 2018-03-15 NOTE — PROGRESS NOTES
Adult Mental Health Partial Hospitalization Group Therapy Progress Note     Client Initial Individualized Goals for Treatment: Develop and apply coping skills to deal with the stress and anxiety at work, as well as home.      Treatment Goals from ITP:  1) Safety: Client will use coping plan for safety, as needed.  2) Symptom Management: Michelle will process her triggers for anxiety and panic and learn and practice skills around managing symptoms of depression and anxiety.   3) Life Skills:  Michelle will:    Learn, practice, generalize 2-3 skills/strategies that help promote lifestyle balance including self-care and leisure activities    Learn, practice, apply 2 sensory or mindfulness based coping skills for increased participation and enjoyment of meaningful activities and relationships  4) Discharge Preparation: Michelle will develop Aftercare Plan including:    Have a transition plan in place by discharge    Learn and verbalize understanding of relapse management.     Area of Treatment Focus:  Symptom Management, Personal Safety and Cultural / Spirituality    Therapeutic Interventions/Treatment Strategies:  Support, Feedback, Safety Assessments and Education    Response to Treatment Strategies:  Accepted Feedback, Gave Feedback, Listened, Focused on Goals, Attentive, Accepted Support and Alert    Name of Group:  Loss & Grief     Description and Outcome:  Group was lead by  Myo-O. Poems and discussion were used to facilitate the conversation of grief and loss. The group was encouraged to share and process their feelings of loss with a grief diagram. Themes discussed: recognizing grief as a feeling, accepting grief as our own, trusting grief to lead us to hope and remembering to show compassion to ourselves when we face grief.  Michelle was an active participant in group sharing her experience with grief.  Michelle discussed the loss of a negative coping skills (no longer uses this skill) and although it's a  positive change it's still a loss.  Demonstrated good insight into the topic.  Client verbalized understanding of session content by exploring her experience with grief and insights she has had regarding this.       Is this a Weekly Review of the Progress on the Treatment Plan?  No

## 2018-03-15 NOTE — PROGRESS NOTES
Adult Mental Health Partial Hospitalization Group Therapy Progress Note     Date: 3/14/18  Time: 1:00-1:50    Client Initial Individualized Goals for Treatment: Develop and apply coping skills to deal with the stress and anxiety at work, as well as home.     Treatment Goals from ITP:  1) Safety: Client will use coping plan for safety, as needed.  2) Symptom Management: Michelle will process her triggers for anxiety and panic and learn and practice skills around managing symptoms of depression and anxiety.   3) Life Skills:  Michelle will:    Learn, practice, generalize 2-3 skills/strategies that help promote lifestyle balance including self-care and leisure activities    Learn, practice, apply 2 sensory or mindfulness based coping skills for increased participation and enjoyment of meaningful activities and relationships  4) Discharge Preparation: Michelle will develop Aftercare Plan including:    Have a transition plan in place by discharge    Learn and verbalize understanding of relapse management.     Area of Treatment Focus:  Symptom Management, Personal Safety, Community Resources/Discharge Planning, Develop / Improve Independent Living Skills and Develop Socialization / Interpersonal Relationship Skills    Therapeutic Interventions/Treatment Strategies:  Support, Feedback, Safety Assessments, Structured Activity, Problem Solving, Clarification and Education    Response to Treatment Strategies:  Accepted Feedback, Gave Feedback, Listened, Focused on Goals, Attentive, Accepted Support and Alert    Name of Group:  OT life skills: stress managment    Description and Outcome: Michelle attended and participated in a structured  life skills group where intervention focuses on learning, practicing, and applying skills and strategies through psycho-education and structured experiential activities to manage mental health, improve symptoms, gain insight, and function in valued roles, routines, and relationships.    Group topic  today is on benefits of meaningful/purposeful activity that supports psychiatric recovery. Michelle chose a calming repetitive task.  Michelle fully engaged and was able to share and offer support and validation to her peers. Affect even, focused. Client demonstrated understanding of session content by applying self-awareness to work towards more meaningful leisure activities at home.    Is this a Weekly Review of the Progress on the Treatment Plan?  Yes.      Are Treatment Plan Goals being addressed?  Yes, continue treatment goals      Are Treatment Plan Strategies to Address Goals Effective?  Yes, continue treatment strategies      Are there any current contracts in place?  No

## 2018-03-15 NOTE — PROGRESS NOTES
Group Therapy Progress Notes     Area of Treatment Focus:  Symptom Management and Develop Socialization / Interpersonal Relationship Skills    Therapeutic Interventions/Treatment Strategies:  Support, Feedback, Structured Activity, Clarification and Education    Response to Treatment Strategies:  Accepted Feedback, Gave Feedback, Listened, Focused on Goals, Attentive, Accepted Support and Alert    Name of Group:  Mental health management    Progress Note  Information presented on authenticity and perfectionism.  Authenticity was presented as self validating, self awareness, in contrast to perfectionism which is self invalidating.  Self talk/cognitive distortions were introduced as contributing to perfectionism.  The group was given handouts outlining distortions and providing instruction for refuting the distortions.  Michelle demonstrated understanding of topic by sharing her use of cognitive distortions as they relate to work and perfectionism.  She stated that she appreciated clarification on topic.          Is this a Weekly Review of the Progress on the Treatment Plan?  No

## 2021-09-08 DIAGNOSIS — Z01.818 PRE-OP EXAM: Primary | ICD-10-CM

## 2021-09-09 ENCOUNTER — HOSPITAL ENCOUNTER (OUTPATIENT)
Facility: CLINIC | Age: 71
End: 2021-09-09
Attending: SPECIALIST | Admitting: SPECIALIST

## 2021-09-09 DIAGNOSIS — Z20.822 ENCOUNTER FOR LABORATORY TESTING FOR COVID-19 VIRUS: Primary | ICD-10-CM

## 2021-09-12 RX ORDER — CEFAZOLIN SODIUM 2 G/100ML
2 INJECTION, SOLUTION INTRAVENOUS
Status: CANCELLED | OUTPATIENT
Start: 2021-09-12

## 2021-09-12 RX ORDER — CEFAZOLIN SODIUM 2 G/100ML
2 INJECTION, SOLUTION INTRAVENOUS SEE ADMIN INSTRUCTIONS
Status: CANCELLED | OUTPATIENT
Start: 2021-09-12

## 2021-10-12 ENCOUNTER — HOSPITAL ENCOUNTER (OUTPATIENT)
Facility: CLINIC | Age: 71
End: 2021-10-12
Attending: SPECIALIST | Admitting: SPECIALIST
Payer: MEDICARE

## 2021-10-12 DIAGNOSIS — Z11.52 ENCOUNTER FOR PREOPERATIVE SCREENING LABORATORY TESTING FOR COVID-19 VIRUS: Primary | ICD-10-CM

## 2021-10-12 DIAGNOSIS — Z01.812 ENCOUNTER FOR PREOPERATIVE SCREENING LABORATORY TESTING FOR COVID-19 VIRUS: Primary | ICD-10-CM

## 2021-10-17 RX ORDER — ACETAMINOPHEN 325 MG/1
975 TABLET ORAL ONCE
Status: CANCELLED | OUTPATIENT
Start: 2021-10-17 | End: 2021-10-17

## 2021-10-17 RX ORDER — CEFAZOLIN SODIUM 2 G/100ML
2 INJECTION, SOLUTION INTRAVENOUS SEE ADMIN INSTRUCTIONS
Status: CANCELLED | OUTPATIENT
Start: 2021-10-17

## 2021-10-17 RX ORDER — CEFAZOLIN SODIUM 2 G/100ML
2 INJECTION, SOLUTION INTRAVENOUS
Status: CANCELLED | OUTPATIENT
Start: 2021-10-17